# Patient Record
Sex: MALE | Race: WHITE | NOT HISPANIC OR LATINO | Employment: OTHER | URBAN - NONMETROPOLITAN AREA
[De-identification: names, ages, dates, MRNs, and addresses within clinical notes are randomized per-mention and may not be internally consistent; named-entity substitution may affect disease eponyms.]

---

## 2018-08-27 ENCOUNTER — OFFICE VISIT (OUTPATIENT)
Dept: PEDIATRICS | Facility: OTHER | Age: 83
End: 2018-08-27
Attending: INTERNAL MEDICINE
Payer: COMMERCIAL

## 2018-08-27 VITALS
HEART RATE: 72 BPM | SYSTOLIC BLOOD PRESSURE: 130 MMHG | DIASTOLIC BLOOD PRESSURE: 84 MMHG | WEIGHT: 189.25 LBS | TEMPERATURE: 97.4 F | BODY MASS INDEX: 26.49 KG/M2 | HEIGHT: 71 IN

## 2018-08-27 DIAGNOSIS — Z96.612 STATUS POST REPLACEMENT OF BOTH SHOULDER JOINTS: ICD-10-CM

## 2018-08-27 DIAGNOSIS — M15.0 PRIMARY OSTEOARTHRITIS INVOLVING MULTIPLE JOINTS: ICD-10-CM

## 2018-08-27 DIAGNOSIS — E78.1 HYPERTRIGLYCERIDEMIA: ICD-10-CM

## 2018-08-27 DIAGNOSIS — M75.102 ROTATOR CUFF SYNDROME, LEFT: Primary | ICD-10-CM

## 2018-08-27 DIAGNOSIS — Z96.611 STATUS POST REPLACEMENT OF BOTH SHOULDER JOINTS: ICD-10-CM

## 2018-08-27 PROBLEM — M19.90 OSTEOARTHROSIS: Status: ACTIVE | Noted: 2018-08-27

## 2018-08-27 PROBLEM — E78.00 HYPERCHOLESTEROLEMIA: Status: ACTIVE | Noted: 2018-08-27

## 2018-08-27 PROCEDURE — 99213 OFFICE O/P EST LOW 20 MIN: CPT | Performed by: INTERNAL MEDICINE

## 2018-08-27 PROCEDURE — G0463 HOSPITAL OUTPT CLINIC VISIT: HCPCS | Performed by: INTERNAL MEDICINE

## 2018-08-27 RX ORDER — TURMERIC 400 MG
1 CAPSULE ORAL DAILY
COMMUNITY

## 2018-08-27 RX ORDER — MULTIVIT WITH MINERALS/LUTEIN
1000 TABLET ORAL DAILY
COMMUNITY

## 2018-08-27 RX ORDER — LOVASTATIN 20 MG
20 TABLET ORAL DAILY
COMMUNITY
Start: 2013-11-05 | End: 2021-05-10

## 2018-08-27 RX ORDER — HYDROCORTISONE ACETATE 0.5 %
1 CREAM (GRAM) TOPICAL 3 TIMES DAILY
COMMUNITY

## 2018-08-27 RX ORDER — CHLORAL HYDRATE 500 MG
1 CAPSULE ORAL DAILY
COMMUNITY

## 2018-08-27 RX ORDER — OMEPRAZOLE 10 MG/1
10 CAPSULE, DELAYED RELEASE ORAL DAILY
COMMUNITY
End: 2022-01-11

## 2018-08-27 ASSESSMENT — PAIN SCALES - GENERAL: PAINLEVEL: MODERATE PAIN (5)

## 2018-08-27 NOTE — MR AVS SNAPSHOT
"              After Visit Summary   8/27/2018    Anibal Tenorio    MRN: [id number]           Patient Information     Date Of Birth          1935        Visit Information        Provider Department      8/27/2018 10:45 AM Derek Guillory MD [facility]        Today's Diagnoses     Rotator cuff syndrome, left    -  1    Status post replacement of both shoulder joints        Hypertriglyceridemia        Primary osteoarthritis involving multiple joints           Follow-ups after your visit        Additional Services     ORTHOPEDICS ADULT REFERRAL       OA or any                  Who to contact     If you have questions or need follow up information about today's clinic visit or your schedule please contact [facility] directly at [phone].  Normal or non-critical lab and imaging results will be communicated to you by Certaliahart, letter or phone within 4 business days after the clinic has received the results. If you do not hear from us within 7 days, please contact the clinic through Certaliahart or phone. If you have a critical or abnormal lab result, we will notify you by phone as soon as possible.  Submit refill requests through Placester or call your pharmacy and they will forward the refill request to us. Please allow 3 business days for your refill to be completed.          Additional Information About Your Visit        MyChart Information     Placester lets you send messages to your doctor, view your test results, renew your prescriptions, schedule appointments and more. To sign up, go to [email or web address] . Click on \"Log in\" on the left side of the screen, which will take you to the Welcome page. Then click on \"Sign up Now\" on the right side of the page.     You will be asked to enter the access code listed below, as well as some personal information. Please follow the directions to create your username and password.     Your access code is: " [email or web address]  Expires: 2018 11:32 AM     Your access code will  in 90 days. If you need help or a new code, please call your [facility] clinic or [phone].        Care EveryWhere ID     This is your Care EveryWhere ID. This could be used by other organizations to access your [facility] medical records  [id number]        Your Vitals Were     Pulse Temperature Height BMI (Body Mass Index)          72 97.4  F (36.3  C) (Tympanic) 5' 10.75\" (1.797 m) 26.58 kg/m2         Blood Pressure from Last 3 Encounters:   18 130/84   08/14/15 130/80   07/14/15 116/78    Weight from Last 3 Encounters:   18 189 lb 4 oz (85.8 kg)   08/14/15 181 lb (82.1 kg)   07/14/15 183 lb (83 kg)              We Performed the Following     ORTHOPEDICS ADULT REFERRAL        Primary Care Provider Office Phone # Fax #    Derek Cody Guillory [phone]       [address] [facility] [address]        Equal Access to Services     [facility]: Hadii aad trinh hadasho Soomaira, waaxda luqadaha, qaybta kaalmada adeegyada, ben mac . So [facility] [phone].    ATENCIÓN: Si habla español, tiene a braxton disposición servicios gratuitos de asistencia lingüística. Llame al [phone].    We comply with applicable federal civil rights laws and [place] laws. We do not discriminate on the basis of race, color, national origin, age, disability, sex, sexual orientation, or gender identity.            Thank you!     Thank you for choosing [facility] AND [facility]  for your care. Our goal is always to provide you with excellent care. Hearing back from our patients is one way we can continue to improve our services. Please take a few minutes to complete the written survey that you may receive in the mail after your visit with us. Thank you!             Your Updated Medication List - Protect others around you: Learn how to safely use, store and throw away your medicines at " www.disposemymeds.org.          This list is accurate as of 8/27/18 11:32 AM.  Always use your most recent med list.                   Brand Name Dispense Instructions for use Diagnosis    ascorbic acid 1000 MG Tabs    vitamin C     Take 1,000 mg by mouth daily        CALCIUM 1000 + D 1000-800 MG-UNIT Tabs   Generic drug:  Calcium Carb-Cholecalciferol      Take 1 tablet by mouth daily        fish oil-omega-3 fatty acids 1000 MG capsule      Take 1 capsule by mouth daily        GLUCOSAMINE 1500 COMPLEX Caps      Take 1 capsule by mouth 3 times daily        L-LYSINE HCL PO      Take 1,000 mg by mouth daily        lovastatin 20 MG tablet    MEVACOR     Take 20 mg by mouth daily        omeprazole 10 MG CR capsule    priLOSEC     Take 10 mg by mouth daily        SYSTANE 0.4-0.3 % Soln ophthalmic solution   Generic drug:  polyethylene glycol 0.4%- propylene glycol 0.3%      Place 1 drop into both eyes 4 times daily as needed for dry eyes        Turmeric 400 MG Caps      Take 1 capsule by mouth daily        VITAMIN D-1000 MAX ST 1000 units Tabs   Generic drug:  cholecalciferol      Take 1,000 Units by mouth daily

## 2018-08-27 NOTE — PROGRESS NOTES
Subjective  Anibal Tenorio is a 82 year old male who presents for multiple concerns.  About a month ago he was having pain on his left lower side area.  Upper outer buttock on the left.  It felt like a nerve, lightning strike.  That improved with ibuprofen.  For the last 6-7 days he has had pain in the left upper outer shoulder area on the back.  It is a dull constant ache that comes and goes.  Worse with not sure, better with ibuprofen.  No change in symptoms with exertion.  Last week he helped his brother move including using a skid , moving lumbar, etc.  No chest pain, no dyspnea, no diaphoresis.  [facility]  He wants me to look at the results.    Problem List/PMH: reviewed in EMR, and made relevant updates today.  Medications: reviewed in EMR, and made relevant updates today.  Allergies: reviewed in EMR, and made relevant updates today.    Social Hx:  Social History   Substance Use Topics     Smoking status: Former Smoker     Packs/day: 0.50     Years: 20.00     Types: Cigarettes     Quit date: 1970     Smokeless tobacco: Never Used      Comment: Quit smoking: coffee 3/d     Alcohol use 8.4 oz/week     Social History     Social History Narrative    . Wife originally from [place].  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  6 children.   Quit smoking at age 35.   Wife was in a memory care unit, not able to speak,  Wife  age 76.  [place] in [place]  [facility]     I reviewed social history and made relevant updates today.    Family Hx:   Family History   Problem Relation Age of Onset     Other - See Comments Mother      Psychiatric illness,Dementia     Other - See Comments Father      Psychiatric illness,Dementia     Prostate Cancer Brother 65     Cancer-prostate,1/2 brother     Colon Cancer No family hx of      Cancer-colon       Objective  Vitals: reviewed in EMR.  /84  Pulse 72  Temp 97.4  F (36.3  C) (Tympanic)   "Ht 5' 10.75\" (1.797 m)  Wt 189 lb 4 oz (85.8 kg)  BMI 26.58 kg/m2    Gen: Pleasant male, NAD.  HEENT: MMM  Neck: Supple  Pulm: Breathing easily  Neuro: Grossly intact  Skin: No concerning lesions.  Psychiatric: Normal affect and insight. Does not appear anxious or depressed.  Musculoskeletal: Positive can test on the left.  Weakness to left shoulder abduction 3/5, internal and external rotation at the shoulders 5/5 bilaterally.  Mild tenderness to palpation over left rhomboid muscle.    [facility]  Total cholesterol 200  Kidney function normal  CBC normal    Assessment    ICD-10-CM    1. Rotator cuff syndrome, left M75.102 ORTHOPEDICS ADULT REFERRAL   2. Status post replacement of both shoulder joints Z96.611 ORTHOPEDICS ADULT REFERRAL    Z96.612    3. Hypertriglyceridemia E78.1    4. Primary osteoarthritis involving multiple joints M15.0      Orders Placed This Encounter   Procedures     ORTHOPEDICS ADULT REFERRAL       He has some weakness of the left shoulder which I believe is from likely several rotator cuff tendinopathy's and/or tears.  Complicated by history of bilateral shoulder replacement.  Recommend orthopedic consultation prior to obtaining MRI.  Low back pain consistent with lumbar disc disease.  Warning signs were discussed.    Plan   -- Expected clinical course discussed   -- Ortho consult    Signed, Derek Guillory MD  Internal Medicine & Pediatrics    "

## 2018-08-28 ASSESSMENT — PATIENT HEALTH QUESTIONNAIRE - PHQ9: SUM OF ALL RESPONSES TO PHQ QUESTIONS 1-9: 2

## 2018-09-13 ENCOUNTER — TRANSFERRED RECORDS (OUTPATIENT)
Dept: ORTHOPEDICS | Facility: OTHER | Age: 83
End: 2018-09-13

## 2018-09-14 ENCOUNTER — MEDICAL CORRESPONDENCE (OUTPATIENT)
Dept: HEALTH INFORMATION MANAGEMENT | Facility: OTHER | Age: 83
End: 2018-09-14

## 2018-09-20 ENCOUNTER — HOSPITAL ENCOUNTER (OUTPATIENT)
Dept: MRI IMAGING | Facility: OTHER | Age: 83
Discharge: HOME | End: 2018-09-20
Attending: ORTHOPAEDIC SURGERY | Admitting: ORTHOPAEDIC SURGERY
Payer: COMMERCIAL

## 2018-09-20 DIAGNOSIS — M25.512 LEFT SHOULDER PAIN: ICD-10-CM

## 2018-09-20 DIAGNOSIS — M75.102 ROTATOR CUFF SYNDROME OF LEFT SHOULDER: ICD-10-CM

## 2018-09-20 PROCEDURE — 73221 MRI JOINT UPR EXTREM W/O DYE: CPT | Mod: LT

## 2018-10-04 ENCOUNTER — TRANSFERRED RECORDS (OUTPATIENT)
Dept: HEALTH INFORMATION MANAGEMENT | Facility: OTHER | Age: 83
End: 2018-10-04

## 2018-11-08 DIAGNOSIS — M75.111 INCOMPLETE ROTATOR CUFF TEAR OR RUPTURE OF RIGHT SHOULDER, NOT SPECIFIED AS TRAUMATIC: Primary | ICD-10-CM

## 2018-11-09 ENCOUNTER — HOSPITAL ENCOUNTER (OUTPATIENT)
Dept: PHYSICAL THERAPY | Facility: OTHER | Age: 83
Setting detail: THERAPIES SERIES
End: 2018-11-09
Attending: PHYSICAL MEDICINE & REHABILITATION
Payer: COMMERCIAL

## 2018-11-09 DIAGNOSIS — M75.111 INCOMPLETE ROTATOR CUFF TEAR OR RUPTURE OF RIGHT SHOULDER, NOT SPECIFIED AS TRAUMATIC: ICD-10-CM

## 2018-11-09 PROCEDURE — 97110 THERAPEUTIC EXERCISES: CPT | Mod: GP

## 2018-11-09 PROCEDURE — 97161 PT EVAL LOW COMPLEX 20 MIN: CPT | Mod: GP

## 2018-11-09 NOTE — PROGRESS NOTES
11/09/18 1000   General Information   Type of Visit Initial OP Ortho PT Evaluation   Start of Care Date 11/09/18   [facility]   Orders Evaluate and Treat   Date of Order 11/08/18   Insurance Type Other   Ins[facility] Comments/[facility]its [facility]   Medical Diagnosis Right RC tear or rupture of right shoulder   Surgical/Medical history reviewed Yes   Body Part(s)   Body Part(s) Shoulder   Presentation and Etiology   Pertinent history of current problem (include personal factors and/or comorbidities that impact the POC) Patient is a 83 y/o male whom presents to PT with c/o a long history of right shoulder pain. He reports a history of  bilateral RCR repairs. Patient reports no specific injury. Has pain when laying on the right side. Reports pain is constant but worse with use in overhead positions. A recent x-ray revealed right shoulder GH and AC joint OA.. An MRI is planned for 11/19/2018.   Impairments A. Pain;E. Decreased flexibility;D. Decreased ROM;F. Decreased strength and endurance   Functional Limitations perform activities of daily living;perform required work activities;perform desired leisure / sports activities   Symptom Location Postreior right shoulder Scapular region   How/Where did it occur From insidious onset   Onset date of current episode/exacerbation 09/01/18   Chronicity New   Pain rating (0-10 point scale) Best (/10);Worst (/10)   Best (/10) 2-3   Worst (/10) 6-8   Pain quality C. Aching   Frequency of pain/symptoms A. Constant   Pain/symptoms are: The same all the time   Pain/symptoms exacerbated by G. Certain positions;C. Lifting;H. Overhead reach;K. Home tasks   Pain/symptoms eased by H. Cold   Current / Previous Interventions   Diagnostic Tests: Other   Other diagnostic tests Plans for MRI 1/19/2018   Prior Level of Function   Prior Level of Function-Mobility NML   Prior Level of Function-ADLs NML   Current Level of Function   Current Community Support  Family/friend caregiver   Patient role/employment history F. Retired   Living environment [facility]   Fall Risk Screen   Fall screen completed by PT   Have you fallen 2 or more times in the past year? No   Have you fallen and had an injury in the past year? No   Is patient a fall risk? No   Abuse Risk Screen   QUESTION TO PATIENT:  Has a member of your family or a partner(now or in the past) intimidated, hurt, manipulated, or controlled you in any way? no   QUESTION TO PATIENT: Do you feel safe going back to the place where you are living? yes   OBSERVATION: Is there reason to believe there has been maltreatment of a vulnerable adult (ie. Physical/Sexual/Emotional abuse, self neglect, lack of adequate food, shelter, medical care, or financial exploitation)? no   Shoulder Objective Findings   Side (if bilateral, select both right and left) Right   Observation Noted guarding with shoulder flexion   Posture Reduced lumbar lordosis   Cervical Screen (ROM, quadrant) NML   Thoracic Mobility Screen WFL   Shoulder ROM Comment Limited right EROT due to pain   Scapulothoracic Rhythm Right scapular dyskinesis   Pec Minor (supine) Flexibility NML   Neer's Test POS   Power-Fredi Test NEG   Bursa Test NEG   Stickney's Test NEG   Crossover Test mild POS   Palpation Tender right teres minor and infraspinatus    Right Shoulder Flexion AROM 140   Right Shoulder Flexion PROM 165   Right Shoulder Abduction AROM 147   Right Shoulder Abduction PROM 163   Right Shoulder ER AROM 58   Right Shoulder ER PROM 75   Right Shoulder IR AROM 74   Right Shoulder IR PROM NML   Right Shoulder Flexion Strength 4   Right Shoulder Abduction Strength 4+   Right Shoulder ER Strength 3-   Right Shoulder IR Strength 5   Right Shoulder Extension Strength 5   Right Mid Trapezius Strength 4   Right Lower Trapezius Strength 4   Planned Therapy Interventions   Planned Therapy Interventions joint mobilization;manual therapy;ROM;strengthening;stretching    Planned Modality Interventions   Planned Modality Interventions Cryotherapy;Electrical stimulation;Hot packs;Ultrasound;Iontophoresis   Planned Modality Interventions Comments Use as needed   Clinical Impression   Criteria for Skilled Therapeutic Interventions Met yes, treatment indicated   PT Diagnosis Right shoulder pain and weakness due to tendinosis   Influenced by the following impairments Pain, weakness   Functional limitations due to impairments Unable to use the right UE in elevated positions.   Clinical Presentation Stable/Uncomplicated   Clinical Decision Making (Complexity) Low complexity   Therapy Frequency 2 times/Week   Predicted Duration of Therapy Intervention (days/wks) 12 weeks   Risk & Benefits of therapy have been explained Yes   Patient, Family & other staff in agreement with plan of care Yes   Education Assessment   Preferred Learning Style Listening;Reading;Demonstration;Pictures/video   Barriers to Learning No barriers   ORTHO GOALS   PT Ortho Eval Goals 1;2;3   Ortho Goal 1   Goal Identifier Pain   Goal Description Patient will report decreased right shoulder paijn to 2-3/10 at worse with use of the shoulder in elevated positions using hand tools for work tasks in 8-12 weeks   Target Date 01/25/19   Ortho Goal 2   Goal Identifier Mobility   Goal Description Patient will demo the ability to use the right shoulder in all nml positions of elevation or rotation for carpentry work tasks in 8-12 weeks   Target Date 01/31/19   Ortho Goal 3   Goal Identifier Strength   Goal Description Patient will demo full right shoulder strength with MMT revealing at least 4+/5 in all muscles of the right RC supporting use of the shoulder for carpentry tasks in 8-12 weeks   Target Date 01/25/19   Total Evaluation Time   Total Evaluation Time 35

## 2018-11-21 DIAGNOSIS — M17.11 PRIMARY OSTEOARTHRITIS OF RIGHT KNEE: Primary | ICD-10-CM

## 2018-11-26 ENCOUNTER — HOSPITAL ENCOUNTER (OUTPATIENT)
Dept: PHYSICAL THERAPY | Facility: OTHER | Age: 83
Setting detail: THERAPIES SERIES
End: 2018-11-26
Attending: PHYSICAL MEDICINE & REHABILITATION
Payer: COMMERCIAL

## 2018-11-26 PROCEDURE — 97140 MANUAL THERAPY 1/> REGIONS: CPT | Mod: GP

## 2018-11-26 PROCEDURE — 97110 THERAPEUTIC EXERCISES: CPT | Mod: GP

## 2018-11-26 PROCEDURE — 97035 APP MDLTY 1+ULTRASOUND EA 15: CPT | Mod: GP

## 2018-11-28 ENCOUNTER — HOSPITAL ENCOUNTER (OUTPATIENT)
Dept: PHYSICAL THERAPY | Facility: OTHER | Age: 83
Setting detail: THERAPIES SERIES
End: 2018-11-28
Attending: PHYSICAL MEDICINE & REHABILITATION
Payer: COMMERCIAL

## 2018-11-28 DIAGNOSIS — M17.11 PRIMARY OSTEOARTHRITIS OF RIGHT KNEE: ICD-10-CM

## 2018-11-28 PROCEDURE — 97110 THERAPEUTIC EXERCISES: CPT | Mod: GP

## 2018-11-28 PROCEDURE — 97161 PT EVAL LOW COMPLEX 20 MIN: CPT | Mod: GP

## 2018-12-13 ENCOUNTER — HOSPITAL ENCOUNTER (OUTPATIENT)
Dept: PHYSICAL THERAPY | Facility: OTHER | Age: 83
Setting detail: THERAPIES SERIES
End: 2018-12-13
Attending: PHYSICAL MEDICINE & REHABILITATION
Payer: COMMERCIAL

## 2018-12-13 PROCEDURE — 97140 MANUAL THERAPY 1/> REGIONS: CPT | Mod: GP

## 2018-12-13 PROCEDURE — 97035 APP MDLTY 1+ULTRASOUND EA 15: CPT | Mod: GP

## 2018-12-13 PROCEDURE — 97110 THERAPEUTIC EXERCISES: CPT | Mod: GP

## 2018-12-17 ENCOUNTER — HOSPITAL ENCOUNTER (OUTPATIENT)
Dept: PHYSICAL THERAPY | Facility: OTHER | Age: 83
Setting detail: THERAPIES SERIES
End: 2018-12-17
Attending: PHYSICAL MEDICINE & REHABILITATION
Payer: COMMERCIAL

## 2018-12-17 PROCEDURE — 97035 APP MDLTY 1+ULTRASOUND EA 15: CPT | Mod: GP

## 2018-12-17 PROCEDURE — 97110 THERAPEUTIC EXERCISES: CPT | Mod: GP

## 2018-12-17 PROCEDURE — 97140 MANUAL THERAPY 1/> REGIONS: CPT | Mod: GP

## 2018-12-21 ENCOUNTER — HOSPITAL ENCOUNTER (OUTPATIENT)
Dept: PHYSICAL THERAPY | Facility: OTHER | Age: 83
Setting detail: THERAPIES SERIES
End: 2018-12-21
Attending: PHYSICAL MEDICINE & REHABILITATION
Payer: COMMERCIAL

## 2018-12-21 PROCEDURE — 97110 THERAPEUTIC EXERCISES: CPT | Mod: GP

## 2018-12-21 PROCEDURE — 97035 APP MDLTY 1+ULTRASOUND EA 15: CPT | Mod: GP

## 2018-12-21 PROCEDURE — 97140 MANUAL THERAPY 1/> REGIONS: CPT | Mod: GP

## 2018-12-27 ENCOUNTER — HOSPITAL ENCOUNTER (OUTPATIENT)
Dept: PHYSICAL THERAPY | Facility: OTHER | Age: 83
Setting detail: THERAPIES SERIES
End: 2018-12-27
Attending: PHYSICAL MEDICINE & REHABILITATION
Payer: COMMERCIAL

## 2018-12-27 PROCEDURE — 97035 APP MDLTY 1+ULTRASOUND EA 15: CPT | Mod: GP

## 2018-12-27 PROCEDURE — 97140 MANUAL THERAPY 1/> REGIONS: CPT | Mod: GP

## 2019-01-03 ENCOUNTER — HOSPITAL ENCOUNTER (OUTPATIENT)
Dept: PHYSICAL THERAPY | Facility: OTHER | Age: 84
Setting detail: THERAPIES SERIES
End: 2019-01-03
Attending: PHYSICAL MEDICINE & REHABILITATION
Payer: COMMERCIAL

## 2019-01-03 PROCEDURE — 97140 MANUAL THERAPY 1/> REGIONS: CPT | Mod: GP

## 2019-01-03 PROCEDURE — 97035 APP MDLTY 1+ULTRASOUND EA 15: CPT | Mod: GP

## 2019-01-03 NOTE — PROGRESS NOTES
Outpatient Physical Therapy Progress Note     Patient: Anibal Tenorio  : 1935    End Dates of Reporting Period:   1/3/2019    Referring Provider: Juani Rob Diagnosis: Right RC tear/rupture     Client Self Report: Reports his shoulder pain continues to decrease. He is able to increase resistance levels for all HEP exercises. He continues to report pain with tasks requiring overhead use of the shoulder.    Objective Measurements:  Objective Measure: ROM  Details: Flexion to 160 deg, Abduction to 155, EROT to 79 deg, IROT NML  Objective Measure: Strength  Details: Left EROT improved to 4-. Flexion and abduction remain at 4+/5. Strength remains limited by posterior shoulder pain.  Objective Measure: Tenderness  Details: Reduced tenderness (1/4) of the right infraspinatus proximal origin. MInor tenderness of the supraspinatus.          Goals:  Goal Identifier Pain   Goal Description Patient will report decreased right shoulder paijn to 2-3/10 at worse with use of the shoulder in elevated positions using hand tools for work tasks in 8-12 weeks   Target Date 19   Date Met     Progress: He is now able to complete 70% of all tasks without pain. He continues to have discomfort with uses of the shoulder in overhead positions     Goal Identifier Mobility   Goal Description Patient will demo the ability to use the right shoulder in all nml positions of elevation or rotation for carpentry work tasks in 8-12 weeks   Target Date 19   Date Met      Progress: See objective measures above. Flexion, EROT/IROT are now equal to the left shoulder. Shoulder abduction ROM remains limited due to pain.     Goal Identifier Strength   Goal Description Patient will demo full right shoulder strength with MMT revealing at least 4+/5 in all muscles of the right RC supporting use of the shoulder for carpentry tasks in 8-12 weeks   Target Date 19   Date Met      Progress: Good progress made except with  EROT which remains limited due to posterior shoulder pain.     Progress Toward Goals:   Progress this reporting period: As above          Plan:  Continue therapy per current plan of care. Request for up to 6 additional PT sessions to develop full ROM and strength, further reduce posterior shoulder pain    Discharge: NO

## 2019-01-07 ENCOUNTER — HOSPITAL ENCOUNTER (OUTPATIENT)
Dept: PHYSICAL THERAPY | Facility: OTHER | Age: 84
Setting detail: THERAPIES SERIES
End: 2019-01-07
Attending: PHYSICAL MEDICINE & REHABILITATION
Payer: COMMERCIAL

## 2019-01-07 PROCEDURE — 97140 MANUAL THERAPY 1/> REGIONS: CPT | Mod: GP

## 2019-01-07 PROCEDURE — 97110 THERAPEUTIC EXERCISES: CPT | Mod: GP

## 2019-01-07 PROCEDURE — 97035 APP MDLTY 1+ULTRASOUND EA 15: CPT | Mod: GP

## 2019-01-15 ENCOUNTER — HOSPITAL ENCOUNTER (OUTPATIENT)
Dept: PHYSICAL THERAPY | Facility: OTHER | Age: 84
Setting detail: THERAPIES SERIES
End: 2019-01-15
Attending: PHYSICAL MEDICINE & REHABILITATION
Payer: COMMERCIAL

## 2019-01-15 PROCEDURE — 97110 THERAPEUTIC EXERCISES: CPT | Mod: GP

## 2019-01-15 PROCEDURE — 97035 APP MDLTY 1+ULTRASOUND EA 15: CPT | Mod: GP

## 2019-01-15 PROCEDURE — 97140 MANUAL THERAPY 1/> REGIONS: CPT | Mod: GP

## 2019-03-06 ENCOUNTER — DOCUMENTATION ONLY (OUTPATIENT)
Dept: OTHER | Facility: CLINIC | Age: 84
End: 2019-03-06

## 2019-03-15 NOTE — ADDENDUM NOTE
Encounter addended by: Dante Bach on: 3/15/2019 4:51 PM   Actions taken: Pend clinical note, Flowsheet accepted, Sign clinical note, Episode resolved

## 2019-03-15 NOTE — PROGRESS NOTES
Outpatient Physical Therapy Discharge Note     Patient: Anibal Tenorio  : 1935    End Dates of Reporting Period:  1/15/2019    Referring Provider: Juani Rob Diagnosis: R RC  Shoulder Pain/tear     Client Self Report:      Objective Measurements:  Objective Measure: ROM  Details: AROM flexion to 167, abduction to 163, EROT to 79, IROT NML  Objective Measure: Strength  Details: Grade 4+ EROT, Flexion 5/5, abduction 4+/5  Objective Measure: Tenderness  Details: Minimal tenderness of the right infraspinatus tendon             Goals:  Goal Identifier Pain   Goal Description Patient will report decreased right shoulder paijn to 2-3/10 at worse with use of the shoulder in elevated positions using hand tools for work tasks in 8-12 weeks   Target Date 19   Date Met  18   Progress: Unplanned Discharge. No formal discontinue evaluation     Goal Identifier Mobility   Goal Description Patient will demo the ability to use the right shoulder in all nml positions of elevation or rotation for carpentry work tasks in 8-12 weeks   Target Date 19   Date Met      Progress: Unplanned Discharge. No formal discontinue evaluation     Goal Identifier Strength   Goal Description Patient will demo full right shoulder strength with MMT revealing at least 4+/5 in all muscles of the right RC supporting use of the shoulder for carpentry tasks in 8-12 weeks   Target Date 19   Date Met      Progress: Unplanned Discharge. No formal discontinue evaluation     Goal Identifier     Goal Description     Target Date     Date Met      Progress:       Progress Toward Goals:   Progress this reporting period: See Last treatment note          Plan:  Discharge from therapy.    Discharge:    Reason for Discharge: Patient has failed to schedule further appointments.    Equipment Issued: None    Discharge Plan:  Unplanned Discharge. No formal discontinue evaluation

## 2020-02-13 ENCOUNTER — TRANSFERRED RECORDS (OUTPATIENT)
Dept: HEALTH INFORMATION MANAGEMENT | Facility: OTHER | Age: 85
End: 2020-02-13

## 2020-11-19 ENCOUNTER — OFFICE VISIT (OUTPATIENT)
Dept: URBAN - METROPOLITAN AREA CLINIC 13 | Facility: CLINIC | Age: 85
End: 2020-11-19
Payer: COMMERCIAL

## 2020-11-19 PROCEDURE — 67028 INJECTION EYE DRUG: CPT | Performed by: OPHTHALMOLOGY

## 2020-11-19 PROCEDURE — 92014 COMPRE OPH EXAM EST PT 1/>: CPT | Performed by: OPHTHALMOLOGY

## 2020-11-19 PROCEDURE — 92134 CPTRZ OPH DX IMG PST SGM RTA: CPT | Performed by: OPHTHALMOLOGY

## 2020-11-19 ASSESSMENT — INTRAOCULAR PRESSURE
OS: 17
OD: 14

## 2020-11-19 NOTE — IMPRESSION/PLAN
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 Plan: He returns for the winter and his last injection OD was 6/9/20. He has mild CME OD today. OCT shows edema nasally OD and PED OS. We discussed the findings and natural history. Discussed observation vs treatment and the R/B of each. I recommend treatment today to reduce the risk of vision loss. Eylea injected OD without complication after discussing the R/BI/A in [facility]. He will call us with any new visual changes.  
RTC 2 months OCT OU; poss eylea OD

## 2020-11-19 NOTE — IMPRESSION/PLAN
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 Plan: The patient has a Posterior Vitreous Detachment (PVD). At this time, no retinal tear or retinal detachment is identified. We discussed the natural history of a PVD. Retinal detachment symptoms were reviewed. Patient was encouraged to call our office if there is an increase in floaters, decrease in vision, or a shadow or curtain is noted in their peripheral vision.

## 2021-01-21 ENCOUNTER — OFFICE VISIT (OUTPATIENT)
Dept: URBAN - METROPOLITAN AREA CLINIC 13 | Facility: CLINIC | Age: 86
End: 2021-01-21
Payer: COMMERCIAL

## 2021-01-21 DIAGNOSIS — H35.3122 NONEXUDATIVE AGE-RELATED MACULAR DEGENERATION, LEFT EYE, INTERMEDIATE DRY STAGE: ICD-10-CM

## 2021-01-21 PROCEDURE — 99213 OFFICE O/P EST LOW 20 MIN: CPT | Performed by: OPHTHALMOLOGY

## 2021-01-21 PROCEDURE — 92134 CPTRZ OPH DX IMG PST SGM RTA: CPT | Performed by: OPHTHALMOLOGY

## 2021-01-21 ASSESSMENT — INTRAOCULAR PRESSURE
OD: 10
OS: 11

## 2021-01-21 NOTE — IMPRESSION/PLAN
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 Plan: He feels his vision is stable and has resolved CME s/p his last injection OD 11/19/20 and prior was 6/9/20. OCT shows no IRF/SRF OD and PED OS. We discussed the findings and natural history. Discussed observation vs treatment and the R/B of each. I recommend treatment today, however, he wants to defer treatment and understands the risk of recurrence and vision loss. He will call us with any new visual changes.  
RTC 1 month OCT OU; poss eylea OD

## 2021-03-04 ENCOUNTER — OFFICE VISIT (OUTPATIENT)
Dept: URBAN - METROPOLITAN AREA CLINIC 13 | Facility: CLINIC | Age: 86
End: 2021-03-04
Payer: COMMERCIAL

## 2021-03-04 DIAGNOSIS — H35.3211 EXUDATIVE AGE-RELATED MACULAR DEGENERATION, RIGHT EYE, WITH ACTIVE CHOROIDAL NEOVASCULARIZATION: ICD-10-CM

## 2021-03-04 DIAGNOSIS — H35.3212 EXUDATIVE AGE-RELATED MACULAR DEGENERATION, RIGHT EYE, WITH INACTIVE CHOROIDAL NEOVASCULARIZATION: Primary | ICD-10-CM

## 2021-03-04 PROCEDURE — 92134 CPTRZ OPH DX IMG PST SGM RTA: CPT | Performed by: OPHTHALMOLOGY

## 2021-03-04 PROCEDURE — 99213 OFFICE O/P EST LOW 20 MIN: CPT | Performed by: OPHTHALMOLOGY

## 2021-03-04 ASSESSMENT — INTRAOCULAR PRESSURE
OD: 17
OS: 17

## 2021-03-04 NOTE — IMPRESSION/PLAN
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 Plan: He feels his vision is good and has resolved CME s/p his last injection OD 11/19/20 and prior was 6/9/20. OCT shows no IRF/SRF OD and PED OS. We discussed the findings and natural history. Discussed observation vs treatment and the R/B of each. He wants to defer treatment and understands the risk of recurrence and vision loss. He will call us with any new visual changes. Otherwise, he will f/u with his retina specialist in [place] in 2 months.  
RTC 9 months OCT OU; poss eylea OD

## 2021-04-15 ENCOUNTER — OFFICE VISIT (OUTPATIENT)
Dept: URBAN - METROPOLITAN AREA CLINIC 13 | Facility: CLINIC | Age: 86
End: 2021-04-15
Payer: COMMERCIAL

## 2021-04-15 PROCEDURE — 99213 OFFICE O/P EST LOW 20 MIN: CPT | Performed by: OPHTHALMOLOGY

## 2021-04-15 PROCEDURE — 92134 CPTRZ OPH DX IMG PST SGM RTA: CPT | Performed by: OPHTHALMOLOGY

## 2021-04-15 ASSESSMENT — INTRAOCULAR PRESSURE
OS: 14
OD: 17

## 2021-04-15 NOTE — IMPRESSION/PLAN
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 Plan: He complains of occasional discomfort OS. However, he's doing well from a retina standpoint. He has resolved CME s/p his last injection OD 11/19/20 and prior was 6/9/20. OCT shows no IRF/SRF OD and PED OS. We discussed the findings and natural history. Discussed observation vs treatment and the R/B of each. He wants to defer treatment and understands the risk of recurrence and vision loss. He will call us with any new visual changes. Otherwise, he will f/u with his retina specialist in [place] in 2 months.  
RTC 8 months OCT OU; luis miguel eylea OD

## 2021-04-26 ENCOUNTER — OFFICE VISIT (OUTPATIENT)
Dept: PEDIATRICS | Facility: OTHER | Age: 86
End: 2021-04-26
Attending: INTERNAL MEDICINE
Payer: COMMERCIAL

## 2021-04-26 VITALS
RESPIRATION RATE: 16 BRPM | DIASTOLIC BLOOD PRESSURE: 62 MMHG | BODY MASS INDEX: 26.27 KG/M2 | OXYGEN SATURATION: 94 % | TEMPERATURE: 96.5 F | HEART RATE: 73 BPM | WEIGHT: 187 LBS | SYSTOLIC BLOOD PRESSURE: 130 MMHG

## 2021-04-26 DIAGNOSIS — K21.9 BILE ACID ESOPHAGEAL REFLUX: ICD-10-CM

## 2021-04-26 DIAGNOSIS — H81.10 BENIGN PAROXYSMAL POSITIONAL VERTIGO, UNSPECIFIED LATERALITY: Primary | ICD-10-CM

## 2021-04-26 DIAGNOSIS — H61.23 CERUMINOSIS, BILATERAL: ICD-10-CM

## 2021-04-26 PROCEDURE — G0463 HOSPITAL OUTPT CLINIC VISIT: HCPCS

## 2021-04-26 PROCEDURE — 99213 OFFICE O/P EST LOW 20 MIN: CPT | Performed by: INTERNAL MEDICINE

## 2021-04-26 NOTE — PROGRESS NOTES
Subjective   Anibal Tenorio is a 85 year old male who presents for evaluation of dizziness and stomach issues.  Friday morning he was swinging his legs out of bed.  He sat up and felt very dizzy and flopped back onto his back.  He tried to get up again and the same thing happened again.  Since then its been happening but a little bit less.  He noticed that it has been happening when he tries to get up from a seated position.  No recent medication changes.  No falls.  He wonders if earwax plugging is a factor.  Noise gets large impacted chunks of wax in his ears.  He has been having stomach issues for a lot of months.  He underwent some testing in [place] including an EGD which showed bile acid reflux.  He tried sucralfate but did not feel like it helped him that much.  His gallbladder has never been removed.    Objective   Vitals: /62 (BP Location: Right arm, Patient Position: Sitting, Cuff Size: Adult Regular)   Pulse 73   Temp 96.5  F (35.8  C) (Tympanic)   Resp 16   Wt 84.8 kg (187 lb)   SpO2 94%   BMI 26.27 kg/m      HEENT: There is impacted cerumen in both external canals bilaterally.    Review and Analysis of Data   I personally reviewed the following:  External notes: No  Results: No  Use of an independent historian: No  Independent review of a test performed by another physician: No  Discussion of management with another physician: No  Moderate risk of morbidity from additional diagnostic testing and/or treatment.    Assessment & Plan   1. Benign paroxysmal positional vertigo, unspecified laterality  We discussed BPPV including pathophysiology, expected clinical course, possible treatments.  We also discussed the possibility for other etiologies including but not limited to cerebellar stroke, cerebellar tumor, acoustic neuroma, others.  - PHYSICAL THERAPY REFERRAL; Future    2. Ceruminosis, bilateral  Impacted cerumen was flushed by the nurse today    3. Bile acid esophageal reflux  I  recommended sucralfate, the patient declines citing he [facility] and plans to discuss it with them.    Patient Instructions    -- PT for vertigo   -- Consider MRI     -- Ear flush today   -- Don't use q-tips      Return if symptoms worsen or fail to improve.    Signed, Derek Guillory MD, FAAP, FACP  Internal Medicine & Pediatrics

## 2021-04-26 NOTE — NURSING NOTE
"Patient presents with complaints of dizziness.  Chief Complaint   Patient presents with     Dizziness       Initial /62 (BP Location: Right arm, Patient Position: Sitting, Cuff Size: Adult Regular)   Pulse 73   Temp 96.5  F (35.8  C) (Tympanic)   Resp 16   Wt 84.8 kg (187 lb)   SpO2 94%   BMI 26.27 kg/m   Estimated body mass index is 26.27 kg/m  as calculated from the following:    Height as of 8/27/18: 1.797 m (5' 10.75\").    Weight as of this encounter: 84.8 kg (187 lb).  Medication Reconciliation: complete    Catia To LPN  "

## 2021-04-29 ENCOUNTER — HOSPITAL ENCOUNTER (OUTPATIENT)
Dept: PHYSICAL THERAPY | Facility: OTHER | Age: 86
Setting detail: THERAPIES SERIES
End: 2021-04-29
Attending: INTERNAL MEDICINE
Payer: COMMERCIAL

## 2021-04-29 DIAGNOSIS — H81.10 BENIGN PAROXYSMAL POSITIONAL VERTIGO, UNSPECIFIED LATERALITY: ICD-10-CM

## 2021-04-29 PROCEDURE — 97162 PT EVAL MOD COMPLEX 30 MIN: CPT | Mod: GP

## 2021-04-29 PROCEDURE — 95992 CANALITH REPOSITIONING PROC: CPT | Mod: GP

## 2021-04-29 NOTE — PROGRESS NOTES
[facility]        OUTPATIENT PHYSICAL THERAPY FUNCTIONAL EVALUATION  PLAN OF TREATMENT FOR OUTPATIENT REHABILITATION  (COMPLETE FOR INITIAL CLAIMS ONLY)  Patient's Last Name, First Name, M.I.  YOB: 1935  Anibal Tenorio     Provider's Name   [facility]   Medical Record No.  [id number]     Start of Care Date:  04/29/21   Onset Date:   4/23/21     Type:     _X__PT   ____OT  ____SLP Medical Diagnosis:  BPPV     PT Diagnosis:  L BPPV Visits from SOC:  1                              __________________________________________________________________________________  Plan of Treatment/Functional Goals:  balance training, neuromuscular re-education(canalith repositioning)           GOALS  standing  Pt able to transfer from supine to standing without vertigo in 4 weeks.   05/27/21    quick movements  Pt able to turn his head quickly for driving without vertigo in 4 weeks.   05/27/21       Therapy Frequency:  1 time/week   Predicted Duration of Therapy Intervention:  up to 6 visits if needed    Nancy Dash, PT                                    I CERTIFY THE NEED FOR THESE SERVICES FURNISHED UNDER        THIS PLAN OF TREATMENT AND WHILE UNDER MY CARE     (Physician co-signature of this document indicates review and certification of the therapy plan).                Certification Date From:  04/29/21   Certification Date To:  06/24/21    Referring Provider:  Dr. Guillory    Initial Assessment  See Epic Evaluation- Start of Care Date: 04/29/21

## 2021-04-29 NOTE — PROGRESS NOTES
04/29/21 1200   Quick Adds   Quick Adds Certification;Vestibular Eval   Type of Visit Initial OP PT Evaluation   General Information   Start of Care Date 04/29/21   Referring Physician Dr. Guillory   Orders Evaluate and Treat as Indicated   Order Date 04/26/21   Medical Diagnosis H81.10 (ICD-10-CM) - Benign paroxysmal positional vertigo, unspecified laterality   Surgical/Medical history reviewed Yes  (stomach issues, shoulder surgeries, OA, allergies)   Pertinent history of current vestibular problem (include personal factors and/or comorbidities that impact the POC)  Hearing loss   Pertinent history of current problem (include personal factors and/or comorbidities that impact the POC) Friday morning 4/23/21 he got up from bed and fell back down backwards then off balance, hasn't been as bad since the first day.  Nothing he can think of that led up to it, was on ladder day before with a bit of a jump down but no fall or anthing that he can think of.  He's been having only a little sx since.  He is careful to get up and stand slowly now and ok.  Not moving quickly as that is worse. When it was bad the first time he felt the room was spinning. He does get earwax build up and it was bad on the left, they couldn't get it at clinic here but [facility] and [facility] were able to [facility].  Flushed both, but L ear worst. No visiual or hearing changes.    Pertinent Visual History  glasses   Patient role/Employment history Retired   Fall Risk Screen   Fall screen completed by PT   Have you fallen 2 or more times in the past year? No   Have you fallen and had an injury in the past year? No   Is patient a fall risk? No   Abuse Screen (yes response referral indicated)   Feels Unsafe at Home or Work/School no   Cognitive Status Examination   Orientation orientation to person, place and time   Range of Motion (ROM)   ROM Comment mild limit with end range rotation and side bending but just pull, no  sx, good flex and ext   Gait   Gait Comments WFL   Oculomotor Exam   Smooth Pursuit Normal   Saccades Normal   VOR Normal   Infrared Goggle Exam or Frenzel Lense Exam   Maurice-Hallpike (right) Negative   Hammond-Hallpike (Left) Upbeating L torsional   Hammond-Hallpike (left) comments less than 5 sec, hard to determine direction   BPPV Canal(s) L Posterior   BPPV Type Canalithasis   Planned Therapy Interventions   Planned Therapy Interventions balance training;neuromuscular re-education  (canalith repositioning)   Clinical Impression   Criteria for Skilled Therapeutic Interventions Met yes, treatment indicated   PT Diagnosis L BPPV   Influenced by the following impairments vertigo   Functional limitations due to impairments impaired transfers, impaired quick movements, impaired mobility   Clinical Presentation Evolving/Changing   Clinical Presentation Rationale improving, age, co-morbidities   Clinical Decision Making (Complexity) Moderate complexity   Therapy Frequency 1 time/week   Predicted Duration of Therapy Intervention (days/wks) up to 6 visits if needed   Risk & Benefits of therapy have been explained Yes   Patient, Family & other staff in agreement with plan of care Yes   Education Assessment   Barriers to Learning Hearing   GOALS   PT Eval Goals 1;2   Goal 1   Goal Identifier standing   Goal Description Pt able to transfer from supine to standing without vertigo in 4 weeks.    Target Date 05/27/21   Goal 2   Goal Identifier quick movements   Goal Description Pt able to turn his head quickly for driving without vertigo in 4 weeks.    Target Date 05/27/21   Total Evaluation Time   PT Annaal, Moderate Complexity Minutes (00914) 30   Therapy Certification   Certification date from 04/29/21   Certification date to 06/24/21   Medical Diagnosis BPPV

## 2021-05-10 ENCOUNTER — HOSPITAL ENCOUNTER (OUTPATIENT)
Dept: ULTRASOUND IMAGING | Facility: OTHER | Age: 86
End: 2021-05-10
Attending: INTERNAL MEDICINE
Payer: COMMERCIAL

## 2021-05-10 ENCOUNTER — OFFICE VISIT (OUTPATIENT)
Dept: PEDIATRICS | Facility: OTHER | Age: 86
End: 2021-05-10
Attending: INTERNAL MEDICINE
Payer: COMMERCIAL

## 2021-05-10 VITALS
HEIGHT: 71 IN | RESPIRATION RATE: 17 BRPM | DIASTOLIC BLOOD PRESSURE: 70 MMHG | SYSTOLIC BLOOD PRESSURE: 118 MMHG | OXYGEN SATURATION: 96 % | TEMPERATURE: 97.6 F | BODY MASS INDEX: 25.9 KG/M2 | WEIGHT: 185 LBS | HEART RATE: 73 BPM

## 2021-05-10 DIAGNOSIS — N50.89 TESTICULAR MASS: ICD-10-CM

## 2021-05-10 DIAGNOSIS — K21.9 BILE ACID ESOPHAGEAL REFLUX: Primary | ICD-10-CM

## 2021-05-10 PROCEDURE — G0463 HOSPITAL OUTPT CLINIC VISIT: HCPCS | Mod: 25

## 2021-05-10 PROCEDURE — G0463 HOSPITAL OUTPT CLINIC VISIT: HCPCS

## 2021-05-10 PROCEDURE — 76870 US EXAM SCROTUM: CPT

## 2021-05-10 PROCEDURE — 99213 OFFICE O/P EST LOW 20 MIN: CPT | Performed by: INTERNAL MEDICINE

## 2021-05-10 SDOH — HEALTH STABILITY: MENTAL HEALTH: HOW OFTEN DO YOU HAVE A DRINK CONTAINING ALCOHOL?: 4 OR MORE TIMES A WEEK

## 2021-05-10 SDOH — HEALTH STABILITY: MENTAL HEALTH: HOW MANY STANDARD DRINKS CONTAINING ALCOHOL DO YOU HAVE ON A TYPICAL DAY?: 1 OR 2

## 2021-05-10 SDOH — HEALTH STABILITY: MENTAL HEALTH: HOW OFTEN DO YOU HAVE 6 OR MORE DRINKS ON ONE OCCASION?: NOT ASKED

## 2021-05-10 ASSESSMENT — PAIN SCALES - GENERAL: PAINLEVEL: NO PAIN (0)

## 2021-05-10 ASSESSMENT — MIFFLIN-ST. JEOR: SCORE: 1538.34

## 2021-05-10 NOTE — PROGRESS NOTES
"Subjective   Anibal Tenorio is a 85 year old male who presents for review of records, growth on testicle.  He had some diagnostic work-up done in [place] for his acid reflux.  Please see media for scanned documents.  He underwent an EGD.  They found gastritis consistent with bile acid reflux and recommend PPI and Carafate.  He is no longer taking Protonix, but switched to omeprazole and symptoms are well controlled.  He took Carafate for a while and then discontinued it.  He has noticed a swelling in his right testicle.  The right testicle has always been larger than the left since he can remember.  Now he thinks \"the plumbing is enlarged\" on the right side.  There is no pain.  There is enlargement by the tubes.  No hernia.    Objective   Vitals: /70 (BP Location: Right arm, Patient Position: Sitting, Cuff Size: Adult Regular)   Pulse 73   Temp 97.6  F (36.4  C) (Tympanic)   Resp 17   Ht 1.791 m (5' 10.5\")   Wt 83.9 kg (185 lb)   SpO2 96%   BMI 26.17 kg/m      : Testicles are descended bilaterally.  No testicular tenderness or masses.  There is a mass palpable on the right epididymis.  No hernias are seen.    Review and Analysis of Data   I personally reviewed the following:  External notes: Yes  Results: Yes  Use of an independent historian: No  Independent review of a test performed by another physician: No  Discussion of management with another physician: No  Moderate risk of morbidity from additional diagnostic testing and/or treatment.    Assessment & Plan   1. Bile acid esophageal reflux  I recommend the use of sucralfate which the patient declined.    2. Testicular mass  Differential diagnosis includes varicocele, spermatocele, occult malignancy, others.  I recommend an ultrasound as a next step and should abnormalities be present will request urology consultation.  - US Testicular & Scrotum w Doppler Ltd; Future      Signed, Derek Guillory MD, FAAP, FACP  Internal Medicine & Pediatrics    "

## 2021-05-13 ENCOUNTER — TRANSFERRED RECORDS (OUTPATIENT)
Dept: HEALTH INFORMATION MANAGEMENT | Facility: OTHER | Age: 86
End: 2021-05-13

## 2021-05-18 ENCOUNTER — HOSPITAL ENCOUNTER (OUTPATIENT)
Dept: PHYSICAL THERAPY | Facility: OTHER | Age: 86
Setting detail: THERAPIES SERIES
End: 2021-05-18
Attending: INTERNAL MEDICINE
Payer: COMMERCIAL

## 2021-05-18 PROCEDURE — 97112 NEUROMUSCULAR REEDUCATION: CPT | Mod: GP,59

## 2021-05-18 PROCEDURE — 95992 CANALITH REPOSITIONING PROC: CPT | Mod: GP

## 2021-05-20 ENCOUNTER — HOSPITAL ENCOUNTER (OUTPATIENT)
Dept: PHYSICAL THERAPY | Facility: OTHER | Age: 86
Setting detail: THERAPIES SERIES
End: 2021-05-20
Attending: INTERNAL MEDICINE
Payer: COMMERCIAL

## 2021-05-20 PROCEDURE — 95992 CANALITH REPOSITIONING PROC: CPT | Mod: GP

## 2021-05-20 PROCEDURE — 97112 NEUROMUSCULAR REEDUCATION: CPT | Mod: GP,59

## 2021-07-13 NOTE — PROGRESS NOTES
Outpatient Physical Therapy Discharge Note     Patient: Anibal Tenorio  : 1935    Beginning/End Dates of Reporting Period:  21 to 21 (3 visits through 21)    Referring Provider: Dr. Guillory    Therapy Diagnosis: L BPPV     Client Self Report: Been working on balance exercises and feeling pretty good, very slight sx.       Goals:  Goal Identifier standing   Goal Description Pt able to transfer from supine to standing without vertigo in 4 weeks.    Target Date 21   Date Met  21   Progress (detail required for progress note):     Goal Identifier quick movements   Goal Description Pt able to turn his head quickly for driving without vertigo in 4 weeks.    Target Date 21   Date Met  21   Progress (detail required for progress note):       Plan:  Discharge from therapy.    Discharge:    Reason for Discharge: Patient has met all goals.    Equipment Issued: na    Discharge Plan: Patient to continue home program.

## 2021-09-23 ENCOUNTER — OFFICE VISIT (OUTPATIENT)
Dept: PEDIATRICS | Facility: OTHER | Age: 86
End: 2021-09-23
Attending: INTERNAL MEDICINE
Payer: COMMERCIAL

## 2021-09-23 VITALS
BODY MASS INDEX: 25.8 KG/M2 | TEMPERATURE: 96.9 F | DIASTOLIC BLOOD PRESSURE: 76 MMHG | SYSTOLIC BLOOD PRESSURE: 128 MMHG | RESPIRATION RATE: 16 BRPM | OXYGEN SATURATION: 96 % | WEIGHT: 182.4 LBS | HEART RATE: 74 BPM

## 2021-09-23 DIAGNOSIS — K21.9 BILE ACID ESOPHAGEAL REFLUX: Primary | Chronic | ICD-10-CM

## 2021-09-23 DIAGNOSIS — I80.01 THROMBOPHLEBITIS OF SUPERFICIAL VEINS OF RIGHT LOWER EXTREMITY: ICD-10-CM

## 2021-09-23 DIAGNOSIS — I83.813 VARICOSE VEINS OF BOTH LOWER EXTREMITIES WITH PAIN: ICD-10-CM

## 2021-09-23 DIAGNOSIS — Z23 NEED FOR PROPHYLACTIC VACCINATION AND INOCULATION AGAINST INFLUENZA: ICD-10-CM

## 2021-09-23 PROCEDURE — G0463 HOSPITAL OUTPT CLINIC VISIT: HCPCS | Mod: 25

## 2021-09-23 PROCEDURE — G0008 ADMIN INFLUENZA VIRUS VAC: HCPCS

## 2021-09-23 PROCEDURE — 90662 IIV NO PRSV INCREASED AG IM: CPT

## 2021-09-23 PROCEDURE — G0463 HOSPITAL OUTPT CLINIC VISIT: HCPCS

## 2021-09-23 PROCEDURE — 99213 OFFICE O/P EST LOW 20 MIN: CPT | Performed by: INTERNAL MEDICINE

## 2021-09-23 RX ORDER — SUCRALFATE 1 G/1
1 TABLET ORAL 4 TIMES DAILY
Qty: 300 TABLET | Refills: 3 | Status: SHIPPED | OUTPATIENT
Start: 2021-09-23 | End: 2022-01-11

## 2021-09-23 ASSESSMENT — PAIN SCALES - GENERAL: PAINLEVEL: NO PAIN (0)

## 2021-09-23 NOTE — NURSING NOTE
"Chief Complaint   Patient presents with     Gastric Problem       FOOD SECURITY SCREENING QUESTIONS  Hunger Vital Signs:  Within the past 12 months we worried whether our food would run out before we got money to buy more. Never  Within the past 12 months the food we bought just didn't last and we didn't have money to get more. Never  Faustina Villa LPN 9/23/2021 2:11 PM      Initial /76 (BP Location: Right arm, Patient Position: Sitting, Cuff Size: Adult Regular)   Pulse 74   Temp 96.9  F (36.1  C) (Tympanic)   Resp 16   Wt 82.7 kg (182 lb 6.4 oz)   SpO2 96%   BMI 25.80 kg/m   Estimated body mass index is 25.8 kg/m  as calculated from the following:    Height as of 5/10/21: 1.791 m (5' 10.5\").    Weight as of this encounter: 82.7 kg (182 lb 6.4 oz).  Medication Reconciliation: complete    Faustina Villa LPN  "

## 2021-09-23 NOTE — NURSING NOTE
Immunization Documentation    Prior to Immunization administration, verified patients identity using patient's name and date of birth. Please see IMMUNIZATIONS  and order for additional information.  Patient / Parent instructed to remain in clinic for 15 minutes and report any adverse reaction to staff immediately.    Was the entire amount of vaccines given used? Yes    Faustina Villa LPN  9/23/2021   2:34 PM

## 2021-09-23 NOTE — PROGRESS NOTES
Subjective   Anibal Tenorio is a 85 year old male who presents for ongoing stomach issues.  Last winter he had an endoscopy for abdominal pain and was diagnosed with bile acid reflux.  He continues to have intermittent nausea and upset stomach.  Its not severe but enough to bother him.  He no longer takes sucralfate.  He had taken it once a day for about a month.  He wants to avoid medications to reduce acid.  He also has a painful area on his right thigh that you started a couple days ago.  He has varicose veins that do not usually bother him.    Objective   Vitals: /76 (BP Location: Right arm, Patient Position: Sitting, Cuff Size: Adult Regular)   Pulse 74   Temp 96.9  F (36.1  C) (Tympanic)   Resp 16   Wt 82.7 kg (182 lb 6.4 oz)   SpO2 96%   BMI 25.80 kg/m      Cardiovascular: Palpable cord distal right thigh with some warmth and slight erythema.  Prominent varicose veins along the medial aspect of the thighs bilaterally.    Review and Analysis of Data   I personally reviewed the following:  External notes: No  Results: No  Use of an independent historian: No  Independent review of a test performed by another physician: No  Discussion of management with another physician: No  Moderate risk of morbidity from additional diagnostic testing and/or treatment.    Assessment & Plan   1. Bile acid esophageal reflux  We had a lengthy discussion today with regard to bile acid reflux including pathophysiology, expected clinical course, potential interventions.  I educated him on the use of sucralfate.  I recommended a retrial.  If symptoms persist or worsen would request expert consultation.  - sucralfate (CARAFATE) 1 GM tablet; Take 1 tablet (1 g) by mouth 4 times daily  Dispense: 300 tablet; Refill: 3    2. Need for prophylactic vaccination and inoculation against influenza  - INFLUENZA, QUAD, HIGH DOSE, PF, 65YR + (FLUZONE HD)    3. Thrombophlebitis of superficial veins of right lower extremity  4. Varicose  veins of both lower extremities with pain  I believe his symptoms on the thigh are coming from a superficial thrombophlebitis likely of a thrombosed varicose vein.  Conservative treatments are recommended.  If symptoms persist or worsen could consider radiology consult for varicose vein treatments.    Patient Instructions    -- Retrial of sucralfate   -- Try to take it when your stomach will be empty the longest   -- Consider consult to General Surgery vs GI if symptoms persist     -- Flu shot     -- Try Voltaren/diclofenac gel   -- If varicose veins continue to bother would refer to Radiology for treatment    Patient Education     Superficial Thrombophlebitis   The superficial veins are the veins near the surface of the skin. Superficial thrombophlebitis is a problem that occurs when one or more of these veins become red, irritated, and swollen. This is most often because of a blood clot.   Causes  The problem may occur after injury to a vein. It may also occur after having an intravenous (IV) line placed. Other factors that can make the problem more likely include:     Varicose veins    Venous insufficiency    Bleeding disorders    Prolonged periods of rest and not moving around    IV drug abuse    Pregnancy    Use of birth control pills or estrogen therapy  Symptoms  Symptoms may appear in the affected area. They can include:    Pain    Tenderness    Redness    Warmth    Swelling    Hardening of the vein  In most cases, superficial thrombophlebitis resolves on its own with no problems. Treatment is focused on relieving symptoms.   Sometimes, there is a risk that the deep veins in the body may also be involved. This can lead to more serious problems. In such cases, further testing and treatments may be needed. Your healthcare provider can tell you more about this.   Home care  To help relieve pain and swelling, you may be told to:    Apply heat or cold to the affected area. Do this for up to 10 minutes as often as  directed.  ? Heat: Use a warm compress, such as a heating pad.  ? Cold: Use a cold compress, such as a cold pack or bag of ice wrapped in a thin towel.    Take nonsteroidal anti-inflammatory drugs (NSAIDS), such as ibuprofen. In some cases, other pain medicines may be prescribed.    Keep the affected limb (arm or leg) raised above heart level as directed.    Wear elastic compression stockings or bandages as directed.    Don't sit or stand for long periods. Get up and walk often.  To help treat a blood clot, a blood thinner (anticoagulant) may be prescribed. If this is needed, be sure to take the medicine exactly as directed.   Follow-up care  Follow up with your healthcare provider as advised. If imaging tests are done, they will be reviewed by a doctor. You ll be told the results and any new findings that may affect your treatment.   When to seek medical advice  Call your healthcare provider right away if any of these occur:    Fever of 100.4 F (38 C) or higher, or as directed by your healthcare provider    Increasing pain, swelling, or tenderness in the affected area    Spreading warmth or redness in the affected area  Call 911  Call 911 if any of these occur:     Trouble breathing    Chest pain or discomfort that worsens with deep breathing or coughing    Coughing (may cough up blood)    Fast or irregular heartbeat    Sweating    Anxiety    Lightheadedness, dizziness, or fainting    Extreme confusion    Extreme drowsiness or trouble waking up    New pain in the chest, arm, shoulder, neck, or upper back  [employer] last reviewed this educational content on 4/1/2018 2000-2021 [employer]. All rights reserved. This information is not intended as a substitute for professional medical care. Always follow your healthcare professional's instructions.               No follow-ups on file.    Signed, Derek Guillory MD, FAAP, FACP  Internal Medicine & Pediatrics

## 2021-09-23 NOTE — PATIENT INSTRUCTIONS
-- Retrial of sucralfate   -- Try to take it when your stomach will be empty the longest   -- Consider consult to General Surgery vs GI if symptoms persist     -- Flu shot     -- Try Voltaren/diclofenac gel   -- If varicose veins continue to bother would refer to Radiology for treatment    Patient Education     Superficial Thrombophlebitis   The superficial veins are the veins near the surface of the skin. Superficial thrombophlebitis is a problem that occurs when one or more of these veins become red, irritated, and swollen. This is most often because of a blood clot.   Causes  The problem may occur after injury to a vein. It may also occur after having an intravenous (IV) line placed. Other factors that can make the problem more likely include:     Varicose veins    Venous insufficiency    Bleeding disorders    Prolonged periods of rest and not moving around    IV drug abuse    Pregnancy    Use of birth control pills or estrogen therapy  Symptoms  Symptoms may appear in the affected area. They can include:    Pain    Tenderness    Redness    Warmth    Swelling    Hardening of the vein  In most cases, superficial thrombophlebitis resolves on its own with no problems. Treatment is focused on relieving symptoms.   Sometimes, there is a risk that the deep veins in the body may also be involved. This can lead to more serious problems. In such cases, further testing and treatments may be needed. Your healthcare provider can tell you more about this.   Home care  To help relieve pain and swelling, you may be told to:    Apply heat or cold to the affected area. Do this for up to 10 minutes as often as directed.  ? Heat: Use a warm compress, such as a heating pad.  ? Cold: Use a cold compress, such as a cold pack or bag of ice wrapped in a thin towel.    Take nonsteroidal anti-inflammatory drugs (NSAIDS), such as ibuprofen. In some cases, other pain medicines may be prescribed.    Keep the affected limb (arm or leg)  raised above heart level as directed.    Wear elastic compression stockings or bandages as directed.    Don't sit or stand for long periods. Get up and walk often.  To help treat a blood clot, a blood thinner (anticoagulant) may be prescribed. If this is needed, be sure to take the medicine exactly as directed.   Follow-up care  Follow up with your healthcare provider as advised. If imaging tests are done, they will be reviewed by a doctor. You ll be told the results and any new findings that may affect your treatment.   When to seek medical advice  Call your healthcare provider right away if any of these occur:    Fever of 100.4 F (38 C) or higher, or as directed by your healthcare provider    Increasing pain, swelling, or tenderness in the affected area    Spreading warmth or redness in the affected area  Call 911  Call 911 if any of these occur:     Trouble breathing    Chest pain or discomfort that worsens with deep breathing or coughing    Coughing (may cough up blood)    Fast or irregular heartbeat    Sweating    Anxiety    Lightheadedness, dizziness, or fainting    Extreme confusion    Extreme drowsiness or trouble waking up    New pain in the chest, arm, shoulder, neck, or upper back  [employer] last reviewed this educational content on 4/1/2018 2000-2021 [employer]. All rights reserved. This information is not intended as a substitute for professional medical care. Always follow your healthcare professional's instructions.

## 2021-10-04 ENCOUNTER — IMMUNIZATION (OUTPATIENT)
Dept: FAMILY MEDICINE | Facility: OTHER | Age: 86
End: 2021-10-04
Attending: FAMILY MEDICINE
Payer: COMMERCIAL

## 2021-10-04 PROCEDURE — 91300 PR COVID VAC PFIZER DIL RECON 30 MCG/0.3 ML IM: CPT

## 2021-11-23 NOTE — PROGRESS NOTES
" 11/28/18 0800   General Information   Type of Visit Initial OP Ortho PT Evaluation   Start of Care Date 11/28/18   Ref[facility] Phys[facility]   Orders Evaluate and Treat   Orders Comment 8 visits autorized in 180 days   Date of Order 11/21/18   Insurance Type Other   Ins[facility] Comments/Visits [facility]   Medical Diagnosis Primary OA right knee   Precautions/Limitations no known precautions/limitations   Body Part(s)   Body Part(s) Knee   Presentation and Etiology   Pertinent history of current problem (include personal factors and/or comorbidities that impact the POC) Patient is a 83 y/o male whom presents to PT with c/o right knee stiffness, pain and swelling. Reports his pain and stiffness is more significant in the early morning and after prolonged standing/walking. No past Hx of right knee surgery.. He did recieve a left knee menisectomy in 2012. Reports his left knee is \"good\" now. Patient is also recieving PT ytreatment for right shoulder pain/RC impingement currently. 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  Past Medical history is remarkable for multiple joints affected with OA.   Impairments A. Pain;C. Swelling;E. Decreased flexibility;D. Decreased ROM;H. Impaired gait   Symptom Location Right knee general   How/Where did it occur From insidious onset   Onset date of current episode/exacerbation 11/21/18   Chronicity Chronic   Pain rating (0-10 point scale) Best (/10);Worst (/10)   Best (/10) 0   Worst (/10) 4-5/10   Pain quality C. Aching   Frequency of pain/symptoms C. With activity   Pain/symptoms are: Worse in the morning   Pain/symptoms exacerbated by B. Walking;D. Carrying;G. Certain positions   Pain/symptoms eased by A. Sitting;C. Rest;E. Changing positions;H. Cold;I. OTC medication(s)   Progression of symptoms since onset: Worsened   Current / Previous Interventions   Diagnostic Tests: X-ray   X-ray Results Results   X-ray results Not " "available at this facility. Will request [facility]   Prior Level of Function   Prior Level of Function-Mobility Patient is able to complete all functional task despite intermittent knee pain   Prior Level of Function-ADLs As above   Current Level of Function   Current Community Support Family/friend caregiver   Patient role/employment history F. Retired   Living environment [place]   Fall Risk Screen   Fall screen completed by PT   Have you fallen 2 or more times in the past year? No   Have you fallen and had an injury in the past year? No   Is patient a fall risk? No   Abuse Risk Screen   QUESTION TO PATIENT:  Has a member of your family or a partner(now or in the past) intimidated, hurt, manipulated, or controlled you in any way? no   QUESTION TO PATIENT: Do you feel safe going back to the place where you are living? yes   OBSERVATION: Is there reason to believe there has been maltreatment of a vulnerable adult (ie. Physical/Sexual/Emotional abuse, self neglect, lack of adequate food, shelter, medical care, or financial exploitation)? no   Knee Objective Findings   Side (if bilateral, select both right and left) Right   Observation Slight varus positioning   Integumentary  NML   Posture NML   Gait/Locomotion Slight antalgia with first steps after sitting   Balance/Proprioception (Single Leg Stance) 20\"   Knee/Hip Strength Comments Slight limits to knee extension strength due to knee pain   Varus Stress Test Mild POS   Rona's Test Mild POS   Apley's Test POS   External Rotation Test POS   Knee Special Test Comments Probable medial meniscal lesion   Palpation 2-4/4 tenderness of the medial joint line   Right Knee Extension AROM -7   Right Knee Extension PROM -5   Right Knee Flexion AROM 118   Right Knee Flexion PROM 120   Right Knee Flexion Strength 5   Right Knee Extension Strength 4+   Right Hip Abduction Strength 4   Right Quad Set Strength 4+   R VMO Strength 4+   Right Gastrocnemius Flexibility -7 deg "   Right Hamstring Flexibility -15 deg   Right Hip Flexor Flexibility Minor limits   Right Quadricep Flexibility Minor limits   Right ITB Flexibility NML   Planned Therapy Interventions   Planned Therapy Interventions joint mobilization;manual therapy;ROM;strengthening;stretching   Planned Modality Interventions   Planned Modality Interventions Cryotherapy;Hot packs;Ultrasound   Clinical Impression   Criteria for Skilled Therapeutic Interventions Met yes, treatment indicated   PT Diagnosis Gait impairment due to right knee pain, limited mobility   Functional limitations due to impairments Difficulties with gait and tasks requiring long duration standing   Clinical Presentation Stable/Uncomplicated   Clinical Decision Making (Complexity) Low complexity   Therapy Frequency 2 times/Week   Predicted Duration of Therapy Intervention (days/wks) 6-8 weeks   Risk & Benefits of therapy have been explained Yes   Patient, Family & other staff in agreement with plan of care Yes   Clinical Impression Comments Developing/advancing knee pain due to OA. Likely degenerative tear of right medial meniscus   Education Assessment   Preferred Learning Style Listening;Reading;Demonstration;Pictures/video   Barriers to Learning No barriers   ORTHO GOALS   PT Ortho Eval Goals 1;2;3   Ortho Goal 1   Goal Identifier Pain   Goal Description Patient will report that he is able to complete community level gait as desired without limitation due to pain greater than 1-2/10 in 6-8 weeks   Target Date 01/28/19   Ortho Goal 2   Goal Identifier Mobility   Goal Description Patient will demo full right knee mobility supporting improved ability to complete tasks such as lifting/carrying and work in full knee flexion and gait with full knee extension in 6-8 weeks   Target Date 01/28/19   Ortho Goal 3   Goal Identifier Strength   Goal Description Patient will demo full MMT strength in knee, hip muscle groups supporting knee stability during gait and home  tasks requiring knee bending in 8 weeks   Target Date 01/28/19   Total Evaluation Time   Total Evaluation Time 35      Trilobed Flap Text: The defect edges were debeveled with a #15 scalpel blade.  Given the location of the defect and the proximity to free margins a trilobed flap was deemed most appropriate.  Using a sterile surgical marker, an appropriate trilobed flap drawn around the defect.    The area thus outlined was incised deep to adipose tissue with a #15 scalpel blade.  The skin margins were undermined to an appropriate distance in all directions utilizing iris scissors.

## 2021-12-03 ENCOUNTER — OFFICE VISIT (OUTPATIENT)
Dept: URBAN - METROPOLITAN AREA CLINIC 13 | Facility: CLINIC | Age: 86
End: 2021-12-03
Payer: COMMERCIAL

## 2021-12-03 DIAGNOSIS — H43.811 VITREOUS DEGENERATION OF RIGHT EYE: ICD-10-CM

## 2021-12-03 PROCEDURE — 99213 OFFICE O/P EST LOW 20 MIN: CPT | Performed by: OPHTHALMOLOGY

## 2021-12-03 PROCEDURE — 67028 INJECTION EYE DRUG: CPT | Performed by: OPHTHALMOLOGY

## 2021-12-03 PROCEDURE — 92134 CPTRZ OPH DX IMG PST SGM RTA: CPT | Performed by: OPHTHALMOLOGY

## 2021-12-03 ASSESSMENT — INTRAOCULAR PRESSURE
OS: 16
OD: 17

## 2021-12-03 NOTE — IMPRESSION/PLAN
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 Plan: He returns for the winter and his last injection was 8/31/21. He's doing well from a retina standpoint. OCT shows few cysts OD and PED OS. We discussed the findings and natural history. Discussed observation vs treatment and the R/B of each. Based on today's findings, I recommend treatment. Eylea injected OD without complication after discussing the R/IB/A in detail. He will f/u with his retina specialist in [place] in 2-3 months.  
RTC PRN; OCT OU; poss eylea OD

## 2022-01-11 ENCOUNTER — OFFICE VISIT (OUTPATIENT)
Dept: PEDIATRICS | Facility: OTHER | Age: 87
End: 2022-01-11
Attending: INTERNAL MEDICINE
Payer: COMMERCIAL

## 2022-01-11 VITALS
SYSTOLIC BLOOD PRESSURE: 110 MMHG | BODY MASS INDEX: 25.82 KG/M2 | HEART RATE: 70 BPM | WEIGHT: 182.5 LBS | RESPIRATION RATE: 16 BRPM | OXYGEN SATURATION: 97 % | DIASTOLIC BLOOD PRESSURE: 70 MMHG | TEMPERATURE: 96.3 F

## 2022-01-11 DIAGNOSIS — L73.9 FOLLICULITIS: Primary | ICD-10-CM

## 2022-01-11 PROCEDURE — 99213 OFFICE O/P EST LOW 20 MIN: CPT | Performed by: INTERNAL MEDICINE

## 2022-01-11 PROCEDURE — G0463 HOSPITAL OUTPT CLINIC VISIT: HCPCS

## 2022-01-11 RX ORDER — SILVER SULFADIAZINE 10 MG/G
CREAM TOPICAL DAILY
Qty: 85 G | Refills: 1 | Status: SHIPPED | OUTPATIENT
Start: 2022-01-11

## 2022-01-11 ASSESSMENT — PAIN SCALES - GENERAL: PAINLEVEL: NO PAIN (0)

## 2022-01-11 NOTE — PROGRESS NOTES
Subjective   Anibal Tenorio is a 86 year old male who presents for a Derm question.  He has a spot on his left upper cheek.  It started out with a scaly and crusty patch.  It has been healing but very slowly over the course of the last month.  He has been using a topical emollient.  He also has had a little bit of a cold but he thinks it starting to get better.  No fevers or body aches.  No rash.  Slight rhinorrhea is present.  He has received all 3 COVID-19 doses, and the influenza vaccine.    Objective   Vitals: /70 (BP Location: Right arm, Patient Position: Sitting, Cuff Size: Adult Regular)   Pulse 70   Temp (!) 96.3  F (35.7  C) (Tympanic)   Resp 16   Wt 82.8 kg (182 lb 8 oz)   SpO2 97%   BMI 25.82 kg/m      Skin: Small area of erythema left upper cheek area with a small central denuded spot.  No induration.        Assessment & Plan   1. Folliculitis  I recommend use of either Silvadene or triple over-the-counter antibiotic ointment.  If symptoms persist or worsen would consider biopsy either locally versus dermatology referral to exclude skin cancer.  - silver sulfADIAZINE (SILVADENE) 1 % external cream; Apply topically daily  Dispense: 85 g; Refill: 1    Signed, Derek Guillory MD, FAAP, FACP  Internal Medicine & Pediatrics

## 2022-07-07 ENCOUNTER — OFFICE VISIT (OUTPATIENT)
Dept: FAMILY MEDICINE | Facility: OTHER | Age: 87
End: 2022-07-07
Attending: PHYSICIAN ASSISTANT
Payer: COMMERCIAL

## 2022-07-07 ENCOUNTER — HOSPITAL ENCOUNTER (OUTPATIENT)
Dept: GENERAL RADIOLOGY | Facility: OTHER | Age: 87
Discharge: HOME | End: 2022-07-07
Attending: NURSE PRACTITIONER
Payer: COMMERCIAL

## 2022-07-07 VITALS
DIASTOLIC BLOOD PRESSURE: 72 MMHG | TEMPERATURE: 99.1 F | BODY MASS INDEX: 24.14 KG/M2 | RESPIRATION RATE: 17 BRPM | HEART RATE: 79 BPM | SYSTOLIC BLOOD PRESSURE: 112 MMHG | HEIGHT: 72 IN | WEIGHT: 178.2 LBS | OXYGEN SATURATION: 98 %

## 2022-07-07 DIAGNOSIS — J06.9 VIRAL URI WITH COUGH: ICD-10-CM

## 2022-07-07 DIAGNOSIS — U07.1 INFECTION DUE TO 2019 NOVEL CORONAVIRUS: ICD-10-CM

## 2022-07-07 DIAGNOSIS — R05.3 PERSISTENT COUGH: Primary | ICD-10-CM

## 2022-07-07 PROCEDURE — 99213 OFFICE O/P EST LOW 20 MIN: CPT | Performed by: NURSE PRACTITIONER

## 2022-07-07 PROCEDURE — U0005 INFEC AGEN DETEC AMPLI PROBE: HCPCS | Mod: ZL | Performed by: NURSE PRACTITIONER

## 2022-07-07 PROCEDURE — C9803 HOPD COVID-19 SPEC COLLECT: HCPCS | Performed by: NURSE PRACTITIONER

## 2022-07-07 PROCEDURE — G0463 HOSPITAL OUTPT CLINIC VISIT: HCPCS | Mod: 25

## 2022-07-07 PROCEDURE — G0463 HOSPITAL OUTPT CLINIC VISIT: HCPCS

## 2022-07-07 PROCEDURE — 71046 X-RAY EXAM CHEST 2 VIEWS: CPT

## 2022-07-07 ASSESSMENT — PAIN SCALES - GENERAL: PAINLEVEL: NO PAIN (0)

## 2022-07-07 NOTE — PROGRESS NOTES
ASSESSMENT/PLAN:    I have reviewed the nursing notes.  I have reviewed the findings, diagnosis, plan and need for follow up with the patient.    1. Persistent cough  Clear chest xr. covid positive.   - XR Chest 2 Views  - Symptomatic; Unknown COVID-19 Virus (Coronavirus) by PCR Nose    2. Viral URI with cough  3. Infection due to 2019 novel coronavirus  covid positive. Treat symptomatically. As symptoms started 7 days ago, he is beyond the timeframe for antiviral treatment to be helpful/successful as discussed with Anibal and his friend in office visit today. Exam and vitals are reassuring.   -Symptomatic treatment - Encouraged fluids, salt water gargles, honey (only if greater than 1 year in age due to risk of botulism), elevation, humidifier, sinus rinse/netti pot, lozenges, tea, topical vapor rub, popsicles, rest, etc     Follow up if symptoms persist or worsen or concerns    I explained my diagnostic considerations and recommendations to the patient, who voiced understanding and agreement with the treatment plan. All questions were answered. We discussed potential side effects of any prescribed or recommended therapies, as well as expectations for response to treatments.    Marly Lopez NP  7/7/2022  11:50 AM    HPI:  Anibal Tenorio is a 86 year old male who presents to [facility] today for concerns of cold symptoms, worsening over past 1 week. Has a cough mostly dry. Feels like he is gettng worse. Denies shortness of breath. Symptoms include sore throat, sneezing, runny nose. No tests for covid. No known exposures. Has not been out and about much other than errands, grocery store. No known fevers/chills. No body aches, sinus pressure.     Cough sometimes wakes him up at night.  Does not feel well, somewhat lethargic.     Also dry scaley itchy patch of skin on left foreram, using vanicream. Has had it for months.     Past Medical History:   Diagnosis Date     Chronic rhinitis     No Comments Provided      Esophageal reflux     No Comments Provided     Osteoarthrosis involving lower leg     Bilateral     Vasectomy status     1970     Past Surgical History:   Procedure Laterality Date     ARTHROSCOPY SHOULDER      ,Left shoulder     ARTHROSCOPY SHOULDER      ,Right shoulder     COLONOSCOPY      circa , prior history of polyps. In [place]     COLONOSCOPY      2015,Extensive diverticulosis - maintain high fiber diet     ESOPHAGOSCOPY, GASTROSCOPY, DUODENOSCOPY (EGD), COMBINED      last EGD circa . In [place]     Social History     Tobacco Use     Smoking status: Former Smoker     Packs/day: 0.50     Years: 20.00     Pack years: 10.00     Types: Cigarettes     Quit date: 1970     Years since quittin.5     Smokeless tobacco: Never Used     Tobacco comment: Quit smoking: coffee 3/d   Substance Use Topics     Alcohol use: Yes     Alcohol/week: 14.0 standard drinks     Comment: Daily- evenings     Current Outpatient Medications   Medication Sig Dispense Refill     ascorbic acid (VITAMIN C) 1000 MG TABS Take 1,000 mg by mouth daily       Calcium Carb-Cholecalciferol 1000-800 MG-UNIT TABS Take 1 tablet by mouth daily       fish oil-omega-3 fatty acids 1000 MG capsule Take 1 capsule by mouth daily       Glucosamine-Chondroit-Vit C-Mn (GLUCOSAMINE 1500 COMPLEX) CAPS Take 1 capsule by mouth 3 times daily       L-LYSINE HCL PO Take 1,000 mg by mouth daily       propylene glycol (SYSTANE BALANCE) 0.6 % SOLN ophthalmic solution Inject 1 drop into the eye       silver sulfADIAZINE (SILVADENE) 1 % external cream Apply topically daily 85 g 1     Turmeric 400 MG CAPS Take 1 capsule by mouth daily       No Known Allergies  Past medical history, past surgical history, current medications and allergies reviewed and accurate to the best of my knowledge.      ROS:  Refer to HPI    /72   Pulse 79   Temp 99.1  F (37.3  C) (Tympanic)   Resp 17   Ht 1.829 m (6')   Wt 80.8 kg (178 lb 3.2 oz)   SpO2 98%   BMI 24.17  kg/m      EXAM:  General Appearance: Well appearing 86 year old male, appropriate appearance for age. No acute distress   Ears: Left TM intact, translucent with bony landmarks appreciated, no erythema, no effusion, no bulging, no purulence.  Right TM intact, translucent with bony landmarks appreciated, no erythema, no effusion, no bulging, no purulence.  Left auditory canal clear.  Right auditory canal clear.  Normal external ears, non tender.  Eyes: conjunctivae normal without erythema or irritation, corneas clear, no drainage or crusting, no eyelid swelling, pupils equal   Oropharynx: moist mucous membranes, posterior pharynx without erythema, tonsils symmetric, no erythema, no exudates or petechiae, no post nasal drip seen, no trismus, voice clear.    Sinuses:  No sinus tenderness upon palpation of the frontal or maxillary sinuses  Nose:  Bilateral nares: no erythema, no edema, + clear rhinorrhea   Neck: supple without adenopathy  Respiratory: normal chest wall and respirations.  Normal effort.  Clear to auscultation bilaterally, no wheezing, crackles or rhonchi.  No increased work of breathing.  + nonproductive occasional cough appreciated.  Cardiac: RRR with no murmurs  Psychological: normal affect, alert, oriented, and pleasant.     Results for orders placed or performed in visit on 07/07/22   XR Chest 2 Views     Status: None    Narrative    XR CHEST 2 VW    HISTORY: 86 years Male cough x 1 week, fatigue, worsening cough no  improvement. r/o pneumonia.; Persistent cough    COMPARISON: None    TECHNIQUE: 2 views of the chest were obtained.    FINDINGS: Two views of the chest were obtained. Heart size and  pulmonary vascularity are within normal limits, lungs are clear on  both views. No consolidating air space opacities are present.    A few pleural calcifications are present.      Impression    IMPRESSION: Clear chest.    THOR WALDRON MD         SYSTEM ID:  [id number]   Symptomatic; Unknown COVID-19 Virus  (Coronavirus) by PCR Nose     Status: Abnormal    Specimen: Nose; Swab   Result Value Ref Range    SARS CoV2 PCR Positive (A) Negative, Testing sent to reference lab. Results will be returned via unsolicited result    Narrative    Testing was performed using the francesco SARS-CoV-2 assay on the francesco  Aipai0 System. This test should be ordered for the detection of  SARS-CoV-2 in individuals who meet SARS-CoV-2 clinical and/or  epidemiological criteria. Test performance is unknown in asymptomatic  patients. This test is for in vitro diagnostic use under the FDA EUA  for laboratories certified under CLIA to perform high and/or moderate  complexity testing. This test has not been FDA cleared or approved. A  negative result does not rule out the presence of PCR inhibitors in  the specimen or target RNA in concentration below the limit of  detection for the assay. The possibility of a false negative should  be considered if the patient's recent exposure or clinical  presentation suggests COVID-19. This test was validated by the [facility]. This  laboratory is certified under the Clinical Laboratory Improvement  Amendments of 1988 (CLIA-88) as qualified to perform high and/or  moderate complexity laboratory testing.

## 2022-07-07 NOTE — NURSING NOTE
Chief Complaint   Patient presents with     Cold Symptoms       Initial /72   Pulse 79   Temp 99.1  F (37.3  C) (Tympanic)   Resp 17   Ht 1.829 m (6')   Wt 80.8 kg (178 lb 3.2 oz)   SpO2 98%   BMI 24.17 kg/m   Estimated body mass index is 24.17 kg/m  as calculated from the following:    Height as of this encounter: 1.829 m (6').    Weight as of this encounter: 80.8 kg (178 lb 3.2 oz).  Medication Reconciliation: complete    FOOD SECURITY SCREENING QUESTIONS  Hunger Vital Signs:  Within the past 12 months we worried whether our food would run out before we got money to buy more. Never  Within the past 12 months the food we bought just didn't last and we didn't have money to get more. Never  Apolonia Morejon LPN 7/7/2022 11:45 AM

## 2022-07-07 NOTE — PATIENT INSTRUCTIONS
LOTRIMIN cream topically (can purchase over the counter) for suspected fungal rash of left forearm.     Covid test pending.     Chest xray performed today was CLEAR. This is good news. I do not see anything concerning on exam; symptoms are most consistent with viral illness. Recommend increase fluids, treat symptomatically. Follow up if worsening condition over next 1 week.

## 2022-07-08 ENCOUNTER — TELEPHONE (OUTPATIENT)
Dept: NURSING | Facility: CLINIC | Age: 87
End: 2022-07-08

## 2022-07-08 LAB — SARS-COV-2 RNA RESP QL NAA+PROBE: POSITIVE

## 2022-07-08 NOTE — TELEPHONE ENCOUNTER
Coronavirus (COVID-19) Notification    Caller Name (Patient, parent, daughter/son, grandparent, etc)  patient    Reason for call  Notify of Positive Coronavirus (COVID-19) lab results, assess symptoms,  review [facility] recommendations    Lab Result    Lab test:  2019-nCoV rRt-PCR or SARS-CoV-2 PCR    Oropharyngeal AND/OR nasopharyngeal swabs is POSITIVE for 2019-nCoV RNA/SARS-COV-2 PCR (COVID-19 virus)      Gather patient reported symptoms   Assessment   Current Symptoms at time of phone call, reported by patient: (if no symptoms, document: No symptoms] Fatigue, sore throat, congestion, and cough   Date of symptom(s) onset (if applicable) For a few weeks     If at time of call, Patients symptoms have worsened, the Patient should contact 911 or have someone drive them to Emergency Dept promptly:      If Patient calling 911, inform 911 personal that you have tested positive for the Coronavirus (COVID-19).  Place mask on and await 911 to arrive.    If Emergency Dept, If possible, please have another adult drive you to the Emergency Dept but you need to wear mask when in contact with other people.      Treatment Options:   Patient classified as COVID treatment eligible by Epic high risk algorithm: Yes  Is the patient symptomatic at the time of result notification? Yes. Was the onset of symptoms within the last 5 days? No. Was the onset of symptoms within the last 7 days? No.   Refused virtual with provider  Review information with Patient    Your result was positive. This means you have COVID-19 (coronavirus).    How can I protect others?    These guidelines are for isolating before returning to work, school or .    If you DO have symptoms    Stay home and away from others     For at least 5 days after your symptoms started, AND    You are fever free for 24 hours (with no medicine that reduces fever), AND    Your other symptoms are better    Wear a mask for 10 full days anytime you are around  others    If you DON'T have symptoms    Stay home and away from others for at least 5 days after your positive test    Wear a mask for 10 full days anytime you are around others    There may be different guidelines for healthcare facilities.  Please check with the specific sites before arriving.    If you have been told by a doctor that you were severely ill with COVID-19 or are immunocompromised, you should isolate for at least 10 days.    You should not go back to work until you meet the guidelines above for ending your home isolation. You don't need to be retested for COVID-19 before going back to work--studies show that you won't spread the virus if it's been at least 10 days since your symptoms started (or 20 days, if you have a weak immune system).    Employers, schools, and daycares: This is an official notice for this person's medical guidelines for returning in-person.  They must meet the above guidelines before going back to work, school or  in person.    You will receive a positive COVID-19 letter via [employer] or the mail soon with additional self-care information (exception, no letters will be sent to presurgical/preprocedure patients).    Would you like me to review some of that information with you now?  No    If you were tested for an upcoming procedure, please contact your provider for next steps.    Maryanne Chan

## 2022-07-15 ENCOUNTER — ALLIED HEALTH/NURSE VISIT (OUTPATIENT)
Dept: FAMILY MEDICINE | Facility: OTHER | Age: 87
End: 2022-07-15
Attending: FAMILY MEDICINE
Payer: COMMERCIAL

## 2022-07-15 DIAGNOSIS — Z20.822 COVID-19 RULED OUT: Primary | ICD-10-CM

## 2022-07-15 PROCEDURE — U0003 INFECTIOUS AGENT DETECTION BY NUCLEIC ACID (DNA OR RNA); SEVERE ACUTE RESPIRATORY SYNDROME CORONAVIRUS 2 (SARS-COV-2) (CORONAVIRUS DISEASE [COVID-19]), AMPLIFIED PROBE TECHNIQUE, MAKING USE OF HIGH THROUGHPUT TECHNOLOGIES AS DESCRIBED BY CMS-2020-01-R: HCPCS | Mod: ZL

## 2022-07-15 PROCEDURE — C9803 HOPD COVID-19 SPEC COLLECT: HCPCS

## 2022-07-15 NOTE — PROGRESS NOTES
Patient here today for Covid test. Tested positive 7 days ago and wanted to see if still positive.     Apolonia Bentley CNA .............................on 7/15/2022 at 10:21 AM

## 2022-07-16 LAB — SARS-COV-2 RNA RESP QL NAA+PROBE: POSITIVE

## 2022-07-19 ENCOUNTER — TELEPHONE (OUTPATIENT)
Dept: NURSING | Facility: CLINIC | Age: 87
End: 2022-07-19

## 2022-07-19 NOTE — TELEPHONE ENCOUNTER
Coronavirus (COVID-19) Notification    Caller Name (Patient, parent, daughter/son, grandparent, etc)  Anibal    Reason for call  Notify of Positive Coronavirus (COVID-19) lab results, assess symptoms,  review [facility] recommendations    Lab Result    Lab test:  2019-nCoV rRt-PCR or SARS-CoV-2 PCR    Oropharyngeal AND/OR nasopharyngeal swabs is POSITIVE for 2019-nCoV RNA/SARS-COV-2 PCR (COVID-19 virus)      Gather patient reported symptoms   Assessment   Current Symptoms at time of phone call, reported by patient: (if no symptoms, document: No symptoms] No symptoms   Date of symptom(s) onset (if applicable) 7/05/2022(2nd positive test)     If at time of call, Patients symptoms have worsened, the Patient should contact 911 or have someone drive them to Emergency Dept promptly:      If Patient calling 911, inform 911 personal that you have tested positive for the Coronavirus (COVID-19).  Place mask on and await 911 to arrive.    If Emergency Dept, If possible, please have another adult drive you to the Emergency Dept but you need to wear mask when in contact with other people.      Treatment Options:   Patient classified as COVID treatment eligible by Epic high risk algorithm: Yes  Is the patient symptomatic at the time of result notification? No    Review information with Patient    Your result was positive. This means you have COVID-19 (coronavirus).    How can I protect others?    These guidelines are for isolating before returning to work, school or .    If you DO have symptoms    Stay home and away from others     For at least 5 days after your symptoms started, AND    You are fever free for 24 hours (with no medicine that reduces fever), AND    Your other symptoms are better    Wear a mask for 10 full days anytime you are around others    If you DON'T have symptoms    Stay home and away from others for at least 5 days after your positive test    Wear a mask for 10 full days anytime you are around  others    There may be different guidelines for healthcare facilities.  Please check with the specific sites before arriving.    If you have been told by a doctor that you were severely ill with COVID-19 or are immunocompromised, you should isolate for at least 10 days.    You should not go back to work until you meet the guidelines above for ending your home isolation. You don't need to be retested for COVID-19 before going back to work--studies show that you won't spread the virus if it's been at least 10 days since your symptoms started (or 20 days, if you have a weak immune system).    Employers, schools, and daycares: This is an official notice for this person's medical guidelines for returning in-person.  They must meet the above guidelines before going back to work, school or  in person.    You will receive a positive COVID-19 letter via [employer] or the mail soon with additional self-care information.    Would you like me to review some of that information with you now?  No    If you were tested for an upcoming procedure, please contact your provider for next steps.    Marylu Bailey

## 2022-07-26 ENCOUNTER — OFFICE VISIT (OUTPATIENT)
Dept: FAMILY MEDICINE | Facility: OTHER | Age: 87
End: 2022-07-26
Attending: NURSE PRACTITIONER
Payer: COMMERCIAL

## 2022-07-26 VITALS
HEART RATE: 84 BPM | OXYGEN SATURATION: 95 % | DIASTOLIC BLOOD PRESSURE: 64 MMHG | WEIGHT: 180 LBS | SYSTOLIC BLOOD PRESSURE: 112 MMHG | RESPIRATION RATE: 16 BRPM | HEIGHT: 72 IN | BODY MASS INDEX: 24.38 KG/M2 | TEMPERATURE: 98.4 F

## 2022-07-26 DIAGNOSIS — R11.0 NAUSEA: Primary | ICD-10-CM

## 2022-07-26 PROCEDURE — 99213 OFFICE O/P EST LOW 20 MIN: CPT | Performed by: NURSE PRACTITIONER

## 2022-07-26 PROCEDURE — G0463 HOSPITAL OUTPT CLINIC VISIT: HCPCS

## 2022-07-26 RX ORDER — ONDANSETRON 4 MG/1
4 TABLET, ORALLY DISINTEGRATING ORAL EVERY 8 HOURS PRN
Qty: 10 TABLET | Refills: 0 | Status: SHIPPED | OUTPATIENT
Start: 2022-07-26 | End: 2022-07-29

## 2022-07-26 ASSESSMENT — PAIN SCALES - GENERAL: PAINLEVEL: NO PAIN (0)

## 2022-07-26 NOTE — PROGRESS NOTES
ASSESSMENT/PLAN:    I have reviewed the nursing notes.  I have reviewed the findings, diagnosis, plan and need for follow up with the patient.    1. Montez Barnett had a positive COVID test on 7 July, his nausea started shortly after that.  Today his exam is reassuring without abdominal tenderness or fevers.  He denies any vomiting, just a constant nauseous feeling.  He is agreeable to trying prescription Zofran for nausea.  Vitals are reassuring.  I do not feel any lab work is warranted today and I suspect this is likely related to recent COVID-19 infection.  I [facility] event that he does not have any improvement of symptoms in the next week.  Certainly follow-up sooner here in the emergency room if he develops any fevers, diarrhea, abdominal pain or any worsening condition in the meantime.  Also explained to him that it is too late for antiviral medication as he inquired about this.  - ondansetron (ZOFRAN ODT) 4 MG ODT tab; Take 1 tablet (4 mg) by mouth every 8 hours as needed for nausea  Dispense: 10 tablet; Refill: 0    Follow up if symptoms persist or worsen or concerns    I explained my diagnostic considerations and recommendations to the patient, who voiced understanding and agreement with the treatment plan. All questions were answered. We discussed potential side effects of any prescribed or recommended therapies, as well as expectations for response to treatments.    Marly Lopez NP  7/26/2022  10:19 AM    HPI:  Anibal Tenorio is a 86 year old male who presents to [facility] today for concerns of nausea that started 7-10 days ago. Denies diarrhea, vomiting, and fevers. He was positive for covid 19 recently, tested positive on 7/7/22. He has decrease in appetite as well. His covid symptoms ( cough ) has resolved.   Reports the nausea lasts almost all day long.   pepto bismol helped a little bit.   He is inquiring if antiviral medicine would help  him.  Denies dizziness, lightheadedness.   [facility] Gets [facility] physical exams.  Denies abdominal pain.   Last BM was this morning.       Past Medical History:   Diagnosis Date     Chronic rhinitis     No Comments Provided     Esophageal reflux     No Comments Provided     Osteoarthrosis involving lower leg     Bilateral     Vasectomy status          Past Surgical History:   Procedure Laterality Date     ARTHROSCOPY SHOULDER      ,Left shoulder     ARTHROSCOPY SHOULDER      ,Right shoulder     COLONOSCOPY      circa , prior history of polyps. In [place]     COLONOSCOPY      2015,Extensive diverticulosis - maintain high fiber diet     ESOPHAGOSCOPY, GASTROSCOPY, DUODENOSCOPY (EGD), COMBINED      last EGD circa . In [place]     Social History     Tobacco Use     Smoking status: Former Smoker     Packs/day: 0.50     Years: 20.00     Pack years: 10.00     Types: Cigarettes     Quit date: 1970     Years since quittin.6     Smokeless tobacco: Never Used     Tobacco comment: Quit smoking: coffee 3/d   Substance Use Topics     Alcohol use: Yes     Alcohol/week: 14.0 standard drinks     Comment: Daily- evenings     Current Outpatient Medications   Medication Sig Dispense Refill     ascorbic acid (VITAMIN C) 1000 MG TABS Take 1,000 mg by mouth daily       Calcium Carb-Cholecalciferol 1000-800 MG-UNIT TABS Take 1 tablet by mouth daily       fish oil-omega-3 fatty acids 1000 MG capsule Take 1 capsule by mouth daily       Glucosamine-Chondroit-Vit C-Mn (GLUCOSAMINE 1500 COMPLEX) CAPS Take 1 capsule by mouth 3 times daily       L-LYSINE HCL PO Take 1,000 mg by mouth daily       propylene glycol (SYSTANE BALANCE) 0.6 % SOLN ophthalmic solution Inject 1 drop into the eye       silver sulfADIAZINE (SILVADENE) 1 % external cream Apply topically daily 85 g 1     Turmeric 400 MG CAPS Take 1 capsule by mouth daily       No Known Allergies  Past medical history, past surgical history,  current medications and allergies reviewed and accurate to the best of my knowledge.      ROS:  Refer to HPI    /64   Pulse 84   Temp 98.4  F (36.9  C) (Tympanic)   Resp 16   Ht 1.829 m (6')   Wt 81.6 kg (180 lb)   SpO2 95%   BMI 24.41 kg/m      EXAM:  General Appearance: Well appearing 86 year old male, appropriate appearance for age. No acute distress   Respiratory: normal chest wall and respirations.  Normal effort.  Clear to auscultation bilaterally, no wheezing, crackles or rhonchi.  No increased work of breathing.  No cough appreciated.  Cardiac: RRR with no murmurs  Abdomen: soft, nontender, no rigidity, no rebound tenderness or guarding, normal bowel sounds present  :  No suprapubic tenderness to palpation.  Absent CVA tenderness to palpation.    Psychological: normal affect, alert, oriented, and pleasant.

## 2022-07-26 NOTE — NURSING NOTE
Chief Complaint   Patient presents with     Nausea     Patient is here for nausea that started 7-10 days ago. Patient denies diarrhea, vomiting or fevers.     Initial /64   Pulse 84   Temp 98.4  F (36.9  C) (Tympanic)   Resp 16   Ht 1.829 m (6')   Wt 81.6 kg (180 lb)   SpO2 95%   BMI 24.41 kg/m   Estimated body mass index is 24.41 kg/m  as calculated from the following:    Height as of this encounter: 1.829 m (6').    Weight as of this encounter: 81.6 kg (180 lb).  Medication Reconciliation: complete    Kelley Martinez LPN

## 2022-11-10 ENCOUNTER — TRANSFERRED RECORDS (OUTPATIENT)
Dept: HEALTH INFORMATION MANAGEMENT | Facility: OTHER | Age: 87
End: 2022-11-10

## 2023-06-12 ENCOUNTER — OFFICE VISIT (OUTPATIENT)
Dept: PEDIATRICS | Facility: OTHER | Age: 88
End: 2023-06-12
Attending: INTERNAL MEDICINE
Payer: COMMERCIAL

## 2023-06-12 VITALS
DIASTOLIC BLOOD PRESSURE: 68 MMHG | WEIGHT: 178.8 LBS | HEART RATE: 80 BPM | SYSTOLIC BLOOD PRESSURE: 110 MMHG | TEMPERATURE: 98.1 F | HEIGHT: 71 IN | OXYGEN SATURATION: 96 % | RESPIRATION RATE: 16 BRPM | BODY MASS INDEX: 25.03 KG/M2

## 2023-06-12 DIAGNOSIS — J30.2 SEASONAL ALLERGIC RHINITIS, UNSPECIFIED TRIGGER: Primary | ICD-10-CM

## 2023-06-12 PROCEDURE — 99213 OFFICE O/P EST LOW 20 MIN: CPT | Performed by: INTERNAL MEDICINE

## 2023-06-12 PROCEDURE — G0463 HOSPITAL OUTPT CLINIC VISIT: HCPCS

## 2023-06-12 ASSESSMENT — PAIN SCALES - GENERAL: PAINLEVEL: NO PAIN (0)

## 2023-06-12 NOTE — PATIENT INSTRUCTIONS
-- Try nasal saline irrigation (with distilled water)  Nasal saline spray  NeilMed sinus rinse  Neti pot   -- Start nasal steroid spray daily (eg Nasacort, Flonase)   -- When using steroid spray: tilt head forward, spray away from center septum, don't sniff deeply during inhalation.   -- Steroid spray works best when used consistently, not as needed.   -- Start daily Claritin/Allegra/Zyrtec (without -D), can help for sneezing, itchy eyes, etc.   -- Okay to use diphenhydramine (Benadryl) as needed for sneezing, nasal congestion, can cause dry mouth, urinary retention, blurry vision, constipation   -- Okay to briefly use oxymetazoline (Afrin) 2 sprays both nostrils, nightly for 3-4 days, then stop as can cause rebound congestion   -- Shower/bathe before bed to wash pollen away   -- Elevate head of bed to facilitate sinus drainage   -- Consider getting a HEPA filter   -- Use a cool mist humidifier during the dry season, clean weekly with vinegar     -- Consider ENT and/or Allergy consults if not improving

## 2023-06-12 NOTE — PROGRESS NOTES
Assessment & Plan   1. Seasonal allergic rhinitis, unspecified trigger  Think the most likely etiology is allergic rhinosinusitis however differential diagnosis also includes allergy induced asthma, COPD, gastroesophageal reflux disease, esophageal or laryngeal dysphagia, others.  Discussed options and decided on a conservative approach as outlined below.  Warning signs were discussed and prompt repeat evaluation recommended if symptoms persist or worsen.      Patient Instructions    -- Try nasal saline irrigation (with distilled water)    Nasal saline spray    NeilMed sinus rinse    Neti pot   -- Start nasal steroid spray daily (eg Nasacort, Flonase)   -- When using steroid spray: tilt head forward, spray away from center septum, don't sniff deeply during inhalation.   -- Steroid spray works best when used consistently, not as needed.   -- Start daily Claritin/Allegra/Zyrtec (without -D), can help for sneezing, itchy eyes, etc.   -- Okay to use diphenhydramine (Benadryl) as needed for sneezing, nasal congestion, can cause dry mouth, urinary retention, blurry vision, constipation   -- Okay to briefly use oxymetazoline (Afrin) 2 sprays both nostrils, nightly for 3-4 days, then stop as can cause rebound congestion   -- Shower/bathe before bed to wash pollen away   -- Elevate head of bed to facilitate sinus drainage   -- Consider getting a HEPA filter   -- Use a cool mist humidifier during the dry season, clean weekly with vinegar     -- Consider ENT and/or Allergy consults if not improving        Return if symptoms worsen or fail to improve.    Signed, Derek Guillory MD, FAAP, FACP  Internal Medicine & Pediatrics    Subjective   Anibal Tenorio is a 87 year old male who presents for evaluation of cough and congestion.  He has been sick for about 2 months.  He has a lot of postnasal drainage.  He gets a lot of phlegm in his throat.  He has had some itchy skin.  He tried some Benadryl but did not feel like it helped  "that much.  No new medications.    Objective   Vitals: /68   Pulse 80   Temp 98.1  F (36.7  C) (Tympanic)   Resp 16   Ht 1.791 m (5' 10.5\")   Wt 81.1 kg (178 lb 12.8 oz)   SpO2 96%   BMI 25.29 kg/m      HEENT: Tympanic membranes are normal.  Nasal turbinates are pale, boggy.  No cervical lymphadenopathy.  Cardiovascular: Regular, no murmur  Pulmonary: Clear  Skin: Clear    "

## 2023-06-12 NOTE — NURSING NOTE
"Chief Complaint   Patient presents with     Derm Problem     Dry itchy skin     Cough     And congestion x 2 months         Initial Temp 98.1  F (36.7  C) (Tympanic)   Resp 16   Ht 1.791 m (5' 10.5\")   Wt 81.1 kg (178 lb 12.8 oz)   BMI 25.29 kg/m   Estimated body mass index is 25.29 kg/m  as calculated from the following:    Height as of this encounter: 1.791 m (5' 10.5\").    Weight as of this encounter: 81.1 kg (178 lb 12.8 oz).         Norma J. Gosselin, LPN   "

## 2023-07-05 ENCOUNTER — APPOINTMENT (OUTPATIENT)
Dept: GENERAL RADIOLOGY | Facility: OTHER | Age: 88
End: 2023-07-05
Attending: STUDENT IN AN ORGANIZED HEALTH CARE EDUCATION/TRAINING PROGRAM
Payer: COMMERCIAL

## 2023-07-05 ENCOUNTER — HOSPITAL ENCOUNTER (EMERGENCY)
Facility: OTHER | Age: 88
Discharge: HOME | End: 2023-07-05
Attending: STUDENT IN AN ORGANIZED HEALTH CARE EDUCATION/TRAINING PROGRAM | Admitting: STUDENT IN AN ORGANIZED HEALTH CARE EDUCATION/TRAINING PROGRAM
Payer: COMMERCIAL

## 2023-07-05 VITALS
WEIGHT: 170 LBS | DIASTOLIC BLOOD PRESSURE: 81 MMHG | HEIGHT: 72 IN | OXYGEN SATURATION: 97 % | TEMPERATURE: 97.2 F | BODY MASS INDEX: 23.03 KG/M2 | HEART RATE: 59 BPM | SYSTOLIC BLOOD PRESSURE: 138 MMHG | RESPIRATION RATE: 18 BRPM

## 2023-07-05 DIAGNOSIS — R19.7 DIARRHEA, UNSPECIFIED TYPE: ICD-10-CM

## 2023-07-05 LAB
ALBUMIN SERPL BCG-MCNC: 4 G/DL (ref 3.5–5.2)
ALP SERPL-CCNC: 100 U/L (ref 40–129)
ALT SERPL W P-5'-P-CCNC: 15 U/L (ref 0–70)
ANION GAP SERPL CALCULATED.3IONS-SCNC: 10 MMOL/L (ref 7–15)
AST SERPL W P-5'-P-CCNC: 22 U/L (ref 0–45)
BASOPHILS # BLD AUTO: 0 10E3/UL (ref 0–0.2)
BASOPHILS NFR BLD AUTO: 0 %
BILIRUB SERPL-MCNC: 0.7 MG/DL
BUN SERPL-MCNC: 14.9 MG/DL (ref 8–23)
CALCIUM SERPL-MCNC: 8.8 MG/DL (ref 8.8–10.2)
CHLORIDE SERPL-SCNC: 101 MMOL/L (ref 98–107)
CREAT SERPL-MCNC: 1.09 MG/DL (ref 0.67–1.17)
CRP SERPL-MCNC: <3 MG/L
DEPRECATED HCO3 PLAS-SCNC: 27 MMOL/L (ref 22–29)
EOSINOPHIL # BLD AUTO: 0.1 10E3/UL (ref 0–0.7)
EOSINOPHIL NFR BLD AUTO: 2 %
ERYTHROCYTE [DISTWIDTH] IN BLOOD BY AUTOMATED COUNT: 14.1 % (ref 10–15)
GFR SERPL CREATININE-BSD FRML MDRD: 66 ML/MIN/1.73M2
GLUCOSE SERPL-MCNC: 99 MG/DL (ref 70–99)
HCT VFR BLD AUTO: 40.2 % (ref 40–53)
HGB BLD-MCNC: 13.8 G/DL (ref 13.3–17.7)
HOLD SPECIMEN: NORMAL
IMM GRANULOCYTES # BLD: 0 10E3/UL
IMM GRANULOCYTES NFR BLD: 0 %
LIPASE SERPL-CCNC: 15 U/L (ref 13–60)
LYMPHOCYTES # BLD AUTO: 1 10E3/UL (ref 0.8–5.3)
LYMPHOCYTES NFR BLD AUTO: 20 %
MCH RBC QN AUTO: 31.3 PG (ref 26.5–33)
MCHC RBC AUTO-ENTMCNC: 34.3 G/DL (ref 31.5–36.5)
MCV RBC AUTO: 91 FL (ref 78–100)
MONOCYTES # BLD AUTO: 0.5 10E3/UL (ref 0–1.3)
MONOCYTES NFR BLD AUTO: 11 %
NEUTROPHILS # BLD AUTO: 3.1 10E3/UL (ref 1.6–8.3)
NEUTROPHILS NFR BLD AUTO: 67 %
NRBC # BLD AUTO: 0 10E3/UL
NRBC BLD AUTO-RTO: 0 /100
PLATELET # BLD AUTO: 202 10E3/UL (ref 150–450)
POTASSIUM SERPL-SCNC: 4 MMOL/L (ref 3.4–5.3)
PROCALCITONIN SERPL IA-MCNC: 0.05 NG/ML
PROT SERPL-MCNC: 6.5 G/DL (ref 6.4–8.3)
RBC # BLD AUTO: 4.41 10E6/UL (ref 4.4–5.9)
SODIUM SERPL-SCNC: 138 MMOL/L (ref 136–145)
WBC # BLD AUTO: 4.7 10E3/UL (ref 4–11)

## 2023-07-05 PROCEDURE — 85025 COMPLETE CBC W/AUTO DIFF WBC: CPT | Performed by: STUDENT IN AN ORGANIZED HEALTH CARE EDUCATION/TRAINING PROGRAM

## 2023-07-05 PROCEDURE — 83690 ASSAY OF LIPASE: CPT | Performed by: STUDENT IN AN ORGANIZED HEALTH CARE EDUCATION/TRAINING PROGRAM

## 2023-07-05 PROCEDURE — 84145 PROCALCITONIN (PCT): CPT | Performed by: STUDENT IN AN ORGANIZED HEALTH CARE EDUCATION/TRAINING PROGRAM

## 2023-07-05 PROCEDURE — 87506 IADNA-DNA/RNA PROBE TQ 6-11: CPT | Mod: ZL,GZ

## 2023-07-05 PROCEDURE — 80053 COMPREHEN METABOLIC PANEL: CPT | Performed by: STUDENT IN AN ORGANIZED HEALTH CARE EDUCATION/TRAINING PROGRAM

## 2023-07-05 PROCEDURE — 99284 EMERGENCY DEPT VISIT MOD MDM: CPT | Performed by: STUDENT IN AN ORGANIZED HEALTH CARE EDUCATION/TRAINING PROGRAM

## 2023-07-05 PROCEDURE — 36415 COLL VENOUS BLD VENIPUNCTURE: CPT | Performed by: STUDENT IN AN ORGANIZED HEALTH CARE EDUCATION/TRAINING PROGRAM

## 2023-07-05 PROCEDURE — 87493 C DIFF AMPLIFIED PROBE: CPT | Mod: XU,GZ | Performed by: STUDENT IN AN ORGANIZED HEALTH CARE EDUCATION/TRAINING PROGRAM

## 2023-07-05 PROCEDURE — 86140 C-REACTIVE PROTEIN: CPT | Performed by: STUDENT IN AN ORGANIZED HEALTH CARE EDUCATION/TRAINING PROGRAM

## 2023-07-05 PROCEDURE — 74019 RADEX ABDOMEN 2 VIEWS: CPT

## 2023-07-05 NOTE — ED TRIAGE NOTES
Pt arrives VIA private vehicle with complaints of frequent loose stools, reporting some episodes of black stool. Onset about three weeks ago. Pt reports weight loss and decreased appetite over the last month. No dizziness or lightheadedness reported. Pt states he does not take any blood thinner.   /68   Pulse 77   Temp 97.2  F (36.2  C) (Tympanic)   Resp 18   Ht 1.829 m (6')   Wt 77.1 kg (170 lb)   SpO2 97%   BMI 23.06 kg/m          Triage Assessment     Row Name 07/05/23 4269       Triage Assessment (Adult)    Airway WDL WDL       Respiratory WDL    Respiratory WDL WDL       Skin Circulation/Temperature WDL    Skin Circulation/Temperature WDL WDL       Peripheral/Neurovascular WDL    Peripheral Neurovascular WDL WDL       Cognitive/Neuro/Behavioral WDL    Cognitive/Neuro/Behavioral WDL WDL

## 2023-07-05 NOTE — DISCHARGE INSTRUCTIONS
Follow-up with your primary care doctor the next 2 to 3 days.    Use the stool kit and return for analysis.      If you develop any abdominal pain return to the emergency department immediately.  If you develop any bloody stool return to the emergency department.    For mild to moderate pain or fever you can takeTylenol if you do not have allergies to these.    You have any worsening or concerning symptoms please call 911 or return to the emergency department immediately.

## 2023-07-05 NOTE — ED PROVIDER NOTES
ED PROVIDER NOTE  Patient Name: Anibal Tenorio  MRN: [id number]    CC:    Chief Complaint   Patient presents with     Diarrhea     8 episodes within the last day     Melena         MDM  87 year old male presenting with diarrhea.    In the ED, /68   Pulse 77   Temp 97.2  F (36.2  C) (Tympanic)   Resp 18   Ht 1.829 m (6')   Wt 77.1 kg (170 lb)   SpO2 97%   BMI 23.06 kg/m      Physical exam revealed clear auscultation bilaterally.  Benign abdominal exam.  Grossly neurologically intact.  In no acute distress, no accessory muscle use.  Overall does not look critically ill or toxic.      I have independently reviewed and interpreted the patients test results which I have ordered this visit which are the following:  Labs were remarkable for white blood cell count 4.7.  He 113.8.  Procalcitonin 0.05.  Lipase 15.  CRP less than 3.  Sodium 130.  Potassium 4.0.  Creatinine 1.09.  AST 22.  ALT 15.. Imaging included normal x-ray surprisingly shows a moderate stool burden.    Could be having diarrhea through moderate stool burden.  In addition could be infectious, patient was sent home with a stool kit told to return to the emergency room.  If he develops symptoms of constipation to consider MiraLAX and drink plenty of fluids.  Return precautions for any abdominal pain at all which would prompt abdominal imaging.  He does not have any pain today in his abdomen.  All questions answered.      Clinical impression  Diarrhea    Disposition  Home      HPI    Anibal Tenorio is a 87 year old male with PMH of impotence, diverticulosis, who presents with melena.     History of obtained by: Patient and spouse    Patient presents for approximately 1 week of nonbloody diarrhea.  Denies any suspicious foods or recent travel.  No international travel.  Denies any abdominal pain.  Does not have any lightheadedness, palpitations.  Has been able to tolerate p.o. well.  He is concerned given the amount of diarrhea he has been  having.  Denies any dysuria.  Back pain.    PMH and SH reviewed.    10 point ROS reviewed and negative except as stated in HPI.    Past medical history:  Past Medical History:   Diagnosis Date     Chronic rhinitis     No Comments Provided     Esophageal reflux     No Comments Provided     Osteoarthrosis involving lower leg     Bilateral     Vasectomy status            Social history:  Social Connections: Not on file     Social History     Socioeconomic History     Marital status:      Spouse name: None     Number of children: None     Years of education: None     Highest education level: None   Tobacco Use     Smoking status: Former     Packs/day: 0.50     Years: 20.00     Pack years: 10.00     Types: Cigarettes     Quit date: 1970     Years since quittin.5     Smokeless tobacco: Never     Tobacco comments:     Quit smoking: coffee 3/d   Vaping Use     Vaping Use: Never used   Substance and Sexual Activity     Alcohol use: Yes     Alcohol/week: 14.0 standard drinks of alcohol     Comment: Daily- evenings     Drug use: Never     Sexual activity: Not Currently   Social History Narrative    . Wife originally from [place].  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  6 children.   Quit smoking at age 35.   Wife was in a memory care unit, not able to speak,  Wife  age 76.  [place] in [place]  [facility]         I have reviewed the patients prescribed medications:  No current facility-administered medications for this encounter.    Current Outpatient Medications:      ascorbic acid (VITAMIN C) 1000 MG TABS, Take 1,000 mg by mouth daily, Disp: , Rfl:      Calcium Carb-Cholecalciferol 1000-800 MG-UNIT TABS, Take 1 tablet by mouth daily, Disp: , Rfl:      fish oil-omega-3 fatty acids 1000 MG capsule, Take 1 capsule by mouth daily, Disp: , Rfl:      Glucosamine-Chondroit-Vit C-Mn (GLUCOSAMINE 1500 COMPLEX) CAPS, Take 1 capsule by mouth 3 times daily, Disp: ,  Rfl:      L-LYSINE HCL PO, Take 1,000 mg by mouth daily, Disp: , Rfl:      propylene glycol (SYSTANE BALANCE) 0.6 % SOLN ophthalmic solution, Inject 1 drop into the eye, Disp: , Rfl:      silver sulfADIAZINE (SILVADENE) 1 % external cream, Apply topically daily, Disp: 85 g, Rfl: 1     Turmeric 400 MG CAPS, Take 1 capsule by mouth daily, Disp: , Rfl:     Allergies:  No Known Allergies      Vitals:    07/05/23 1454   BP: 104/68   Pulse: 77   Resp: 18   Temp: 97.2  F (36.2  C)   TempSrc: Tympanic   SpO2: 97%   Weight: 77.1 kg (170 lb)   Height: 1.829 m (6')       Physical Exam  Vitals: /68   Pulse 77   Temp 97.2  F (36.2  C) (Tympanic)   Resp 18   Ht 1.829 m (6')   Wt 77.1 kg (170 lb)   SpO2 97%   BMI 23.06 kg/m    Constitutional: awake, NAD  Eyes: No conjunctival injection and normal lids, PERRL, EOMI  ENT: external nose and ears atraumatic  Neck: Symmetric, trachea midline, Supple  CV: RRR, no murmurs appreciated.  Pulm: Unlabored respiratory effort, good air movement, CTAB, no w/c/r appreciated.  GI: Soft, nontender, nondistended.  No rebound or guarding  MSK: No deformities.  No cyanosis.  Neuro: AOx4, normal speech, following commands, grossly symmetric strength in all extremities.  Cranial nerves III-XII grossly intact.    Skin: warm & dry, no rashes or lesions  Psych: Appropriate mood & affect    Past medical history, past surgical history, problem list, family history, social history, medication list, and allergies were reviewed as documented in epic snapshot.  Relevant review of systems are documented within the HPI above.    Complexity of the Problems Addressed  5 (High) - 1 or more chronic illnesses with severe exacerbation, progression, or side effects of treatment or 1 acute chronic illness or injury that poses threat to live or bodily function    Complexity of Data  I have reviewed the following pertinent historical labs, diagnoses, tests, and/or imaging which contribute to complexity of this  patient's care.   4 - (Moderate) - (1 of three) I have reviewed at least 3 of the 4; external notes, review results of unique test, ordering of unique test, assessment requiring independent history. OR independent interpretation of tests done by other physician. OR discussion with specialist (including SW, PT, etc)      Complication Risk  Based on my interpretation of the current labs, historical tests and/or external tests. Patient does have a risk level as stated below of morbidity, threat to life, and bodily function, and severe exacerbation if not treated. I did consider the patients unique social determinants of care.  4 - Moderate ( Rx drug management, significant limitation of treatment options 2/2 social determinants of health, decision regarding elective major surgery without risk, decision regarding minor surgery with identified patient risk).  -Rx drug management such as giving new Rx in the ED, new Rx prescibed for home use, modification of Rx Drugs, review of home meds and considering dose adjustment but not dose adjustment made.  - major/ minor surgery discussions (regarding fracture reduction, joint relocation, chest tube, cardioversion, intubation)      ED Events, Labs and Imaging:  ED Course as of 07/05/23 1645   Wed Jul 05, 2023   1604 WBC: 4.7   1604 Hemoglobin: 13.8   1630 Xr abdomen    IMPRESSION:  Moderate volume of stool, no acute abnormality   1631 MCHC: 34.3   1632 Sodium: 138   1632 Potassium: 4.0   1632 CRP Inflammation: <3.00   1632 Lipase: 15   1640 Procalcitonin(!): 0.05       Andrew Brice MD  Emergency Medicine    Dictation Disclaimer: Some notes are completed with voice-recognition dictation software. Errors are generally corrected in real time. Please contact me via Epic staff message if you note any errors requiring clarification.     Jose Brice MD  07/05/23 1647

## 2023-07-05 NOTE — PROGRESS NOTES
Discharge instructions reviewed with pt, Stool sample kit reviewed with pt labeled and hat with spoon given

## 2023-07-06 ENCOUNTER — LAB (OUTPATIENT)
Dept: LAB | Facility: OTHER | Age: 88
End: 2023-07-06
Attending: STUDENT IN AN ORGANIZED HEALTH CARE EDUCATION/TRAINING PROGRAM
Payer: COMMERCIAL

## 2023-07-06 DIAGNOSIS — R19.5 LOOSE STOOLS: Primary | ICD-10-CM

## 2023-07-06 DIAGNOSIS — A04.9 BACTERIAL INTESTINAL INFECTION, UNSPECIFIED: ICD-10-CM

## 2023-07-06 DIAGNOSIS — R19.7 DIARRHEA, UNSPECIFIED TYPE: ICD-10-CM

## 2023-07-06 LAB — C DIFF TOX B STL QL: NEGATIVE

## 2023-07-06 NOTE — RESULT ENCOUNTER NOTE
Final C.difficile Toxin B PCR with reflex to C.difficile Antigen and Toxins A/B EIA is NEGATIVE.    No treatment or change in treatment per [facility] ED Lab Result Clostridium difficile protocol.

## 2023-07-09 ENCOUNTER — HOSPITAL ENCOUNTER (EMERGENCY)
Facility: OTHER | Age: 88
Discharge: HOME | End: 2023-07-09
Attending: EMERGENCY MEDICINE | Admitting: EMERGENCY MEDICINE
Payer: COMMERCIAL

## 2023-07-09 ENCOUNTER — APPOINTMENT (OUTPATIENT)
Dept: CT IMAGING | Facility: OTHER | Age: 88
End: 2023-07-09
Attending: EMERGENCY MEDICINE
Payer: COMMERCIAL

## 2023-07-09 VITALS
BODY MASS INDEX: 25.06 KG/M2 | HEIGHT: 72 IN | RESPIRATION RATE: 18 BRPM | SYSTOLIC BLOOD PRESSURE: 139 MMHG | HEART RATE: 75 BPM | WEIGHT: 185 LBS | OXYGEN SATURATION: 95 % | DIASTOLIC BLOOD PRESSURE: 87 MMHG | TEMPERATURE: 96.5 F

## 2023-07-09 DIAGNOSIS — K52.9 COLITIS: ICD-10-CM

## 2023-07-09 LAB
ALBUMIN SERPL BCG-MCNC: 3.8 G/DL (ref 3.5–5.2)
ALP SERPL-CCNC: 100 U/L (ref 40–129)
ALT SERPL W P-5'-P-CCNC: 16 U/L (ref 0–70)
ANION GAP SERPL CALCULATED.3IONS-SCNC: 9 MMOL/L (ref 7–15)
AST SERPL W P-5'-P-CCNC: 28 U/L (ref 0–45)
BASOPHILS # BLD AUTO: 0 10E3/UL (ref 0–0.2)
BASOPHILS NFR BLD AUTO: 1 %
BILIRUB SERPL-MCNC: 0.7 MG/DL
BUN SERPL-MCNC: 8.6 MG/DL (ref 8–23)
CALCIUM SERPL-MCNC: 8.6 MG/DL (ref 8.8–10.2)
CHLORIDE SERPL-SCNC: 100 MMOL/L (ref 98–107)
CREAT SERPL-MCNC: 0.81 MG/DL (ref 0.67–1.17)
DEPRECATED HCO3 PLAS-SCNC: 23 MMOL/L (ref 22–29)
EOSINOPHIL # BLD AUTO: 0.1 10E3/UL (ref 0–0.7)
EOSINOPHIL NFR BLD AUTO: 2 %
ERYTHROCYTE [DISTWIDTH] IN BLOOD BY AUTOMATED COUNT: 13.9 % (ref 10–15)
GFR SERPL CREATININE-BSD FRML MDRD: 85 ML/MIN/1.73M2
GLUCOSE SERPL-MCNC: 96 MG/DL (ref 70–99)
HCT VFR BLD AUTO: 41.3 % (ref 40–53)
HGB BLD-MCNC: 14.3 G/DL (ref 13.3–17.7)
HOLD SPECIMEN: NORMAL
IMM GRANULOCYTES # BLD: 0 10E3/UL
IMM GRANULOCYTES NFR BLD: 0 %
LACTATE SERPL-SCNC: 1.7 MMOL/L (ref 0.7–2)
LYMPHOCYTES # BLD AUTO: 1 10E3/UL (ref 0.8–5.3)
LYMPHOCYTES NFR BLD AUTO: 23 %
MCH RBC QN AUTO: 31.5 PG (ref 26.5–33)
MCHC RBC AUTO-ENTMCNC: 34.6 G/DL (ref 31.5–36.5)
MCV RBC AUTO: 91 FL (ref 78–100)
MONOCYTES # BLD AUTO: 0.6 10E3/UL (ref 0–1.3)
MONOCYTES NFR BLD AUTO: 13 %
NEUTROPHILS # BLD AUTO: 2.8 10E3/UL (ref 1.6–8.3)
NEUTROPHILS NFR BLD AUTO: 61 %
NRBC # BLD AUTO: 0 10E3/UL
NRBC BLD AUTO-RTO: 0 /100
PLATELET # BLD AUTO: 234 10E3/UL (ref 150–450)
POTASSIUM SERPL-SCNC: 3.8 MMOL/L (ref 3.4–5.3)
PROT SERPL-MCNC: 6.6 G/DL (ref 6.4–8.3)
RBC # BLD AUTO: 4.54 10E6/UL (ref 4.4–5.9)
SODIUM SERPL-SCNC: 132 MMOL/L (ref 136–145)
WBC # BLD AUTO: 4.5 10E3/UL (ref 4–11)

## 2023-07-09 PROCEDURE — 74177 CT ABD & PELVIS W/CONTRAST: CPT | Mod: TC

## 2023-07-09 PROCEDURE — 85025 COMPLETE CBC W/AUTO DIFF WBC: CPT | Performed by: EMERGENCY MEDICINE

## 2023-07-09 PROCEDURE — 99284 EMERGENCY DEPT VISIT MOD MDM: CPT | Performed by: EMERGENCY MEDICINE

## 2023-07-09 PROCEDURE — 99285 EMERGENCY DEPT VISIT HI MDM: CPT | Mod: 25 | Performed by: EMERGENCY MEDICINE

## 2023-07-09 PROCEDURE — 36415 COLL VENOUS BLD VENIPUNCTURE: CPT | Performed by: EMERGENCY MEDICINE

## 2023-07-09 PROCEDURE — 83605 ASSAY OF LACTIC ACID: CPT | Performed by: EMERGENCY MEDICINE

## 2023-07-09 PROCEDURE — 250N000011 HC RX IP 250 OP 636: Mod: JZ | Performed by: EMERGENCY MEDICINE

## 2023-07-09 PROCEDURE — 80053 COMPREHEN METABOLIC PANEL: CPT | Performed by: EMERGENCY MEDICINE

## 2023-07-09 RX ORDER — IOPAMIDOL 755 MG/ML
100 INJECTION, SOLUTION INTRAVASCULAR ONCE
Status: COMPLETED | OUTPATIENT
Start: 2023-07-09 | End: 2023-07-09

## 2023-07-09 RX ORDER — CIPROFLOXACIN 500 MG/1
500 TABLET, FILM COATED ORAL 2 TIMES DAILY
Qty: 20 TABLET | Refills: 0 | Status: SHIPPED | OUTPATIENT
Start: 2023-07-09 | End: 2023-12-11

## 2023-07-09 RX ORDER — METRONIDAZOLE 500 MG/1
500 TABLET ORAL 2 TIMES DAILY
Qty: 20 TABLET | Refills: 0 | Status: SHIPPED | OUTPATIENT
Start: 2023-07-09 | End: 2023-07-09

## 2023-07-09 RX ORDER — METRONIDAZOLE 500 MG/1
500 TABLET ORAL 2 TIMES DAILY
Qty: 20 TABLET | Refills: 0 | Status: SHIPPED | OUTPATIENT
Start: 2023-07-09 | End: 2023-12-11

## 2023-07-09 RX ORDER — CIPROFLOXACIN 500 MG/1
500 TABLET, FILM COATED ORAL 2 TIMES DAILY
Qty: 20 TABLET | Refills: 0 | Status: SHIPPED | OUTPATIENT
Start: 2023-07-09 | End: 2023-07-09

## 2023-07-09 RX ADMIN — IOPAMIDOL 100 ML: 755 INJECTION, SOLUTION INTRAVENOUS at 11:05

## 2023-07-09 ASSESSMENT — ENCOUNTER SYMPTOMS
ARTHRALGIAS: 0
DYSURIA: 0
VOMITING: 0
DIARRHEA: 1
CHILLS: 0
BLOOD IN STOOL: 1
AGITATION: 0
CHEST TIGHTNESS: 0
SHORTNESS OF BREATH: 0
LIGHT-HEADEDNESS: 0
FEVER: 0
ABDOMINAL PAIN: 0
NAUSEA: 0

## 2023-07-09 ASSESSMENT — ACTIVITIES OF DAILY LIVING (ADL): ADLS_ACUITY_SCORE: 35

## 2023-07-09 NOTE — DISCHARGE INSTRUCTIONS
Try the antibiotics to see if this helps with your diarrhea.  Follow-up in clinic with Dr. Guillory, however if prior to being able to see him you are feeling worse in any way, if the bleeding continues and you are feeling weak dizzy or lightheaded, or if you develop fevers or chills, return to the emergency department for reevaluation.

## 2023-07-09 NOTE — ED TRIAGE NOTES
"Pt complains of bright red blood with BM only that started last night. \"enough to cover the tissue and its not dripping\" Pt states has had loose stools \"since there was snow on the ground\" Denies pain.      Triage Assessment     Row Name 07/09/23 0936       Triage Assessment (Adult)    Airway WDL WDL       Respiratory WDL    Respiratory WDL WDL       Skin Circulation/Temperature WDL    Skin Circulation/Temperature WDL WDL       Cardiac WDL    Cardiac WDL WDL       Peripheral/Neurovascular WDL    Peripheral Neurovascular WDL WDL       Cognitive/Neuro/Behavioral WDL    Cognitive/Neuro/Behavioral WDL WDL       Opal Coma Scale    Best Eye Response 4-->(E4) spontaneous    Best Motor Response 6-->(M6) obeys commands    Best Verbal Response 5-->(V5) oriented    Opal Coma Scale Score 15              "

## 2023-07-09 NOTE — PROGRESS NOTES
IV Contrast- Discharge Instructions After Your CT Scan      The IV contrast you received today will be filtered from your bloodstream by your kidneys during the next 24 hours and pass from the body in urine.  You will not be aware of this process and your urine will not change in color.  To help this process you should drink at least 4 additional glasses of water or juice today.  This reduces stress on your kidneys.    Most contrast reactions are immediate.  Should you develop symptoms of concern after discharge, contact the department at the number below.  After hours you should contact your personal physician.  If you develop breathing distress or wheezing, call 911.      1.  Has the patient had a previous reaction to IV contrast? n    2.  Does the patient have kidney disease? n    3.  Is the patient on dialysis? n    If YES to any of these questions, exam will be reviewed with a Radiologist before administering contrast.  Pt GFR with in range for contrast.

## 2023-07-09 NOTE — ED PROVIDER NOTES
"  History     Chief Complaint   Patient presents with     Rectal Bleeding     HPI  Anibal Tenorio is a 87 year old male who is here complaining of bright red blood with his stool this morning.  He was seen here earlier in the week complaining of diarrhea that has been going on \"since there was snow on the ground\".  He thinks has been a few months that he has been having multiple diarrhea stools per day.  Normally they are a beige color.  He denies seeing any blood.  Denies black or bloody or tarry stools.  Not feeling ill otherwise.  Not much of an appetite but he is still eating and drinking.  Today when he got up this morning and went to the bathroom he wiped and there was blood covering the toilet paper.  He said he could see blood in the toilet bowl water as well.  It was bright red.    Allergies:  No Known Allergies    Problem List:    Patient Active Problem List    Diagnosis Date Noted     Seasonal allergic rhinitis, unspecified trigger 06/12/2023     Priority: Medium     Ceruminosis, bilateral 04/26/2021     Priority: Medium     Benign paroxysmal positional vertigo, unspecified laterality 04/26/2021     Priority: Medium     Hypercholesterolemia 08/27/2018     Priority: Medium     Osteoarthrosis 08/27/2018     Priority: Medium     Rotator cuff syndrome, left 08/27/2018     Priority: Medium     Status post replacement of both shoulder joints 08/27/2018     Priority: Medium     Hypertriglyceridemia 08/27/2018     Priority: Medium     Primary osteoarthritis involving multiple joints 08/27/2018     Priority: Medium     Hiatal hernia 08/14/2015     Priority: Medium     Diverticulosis of large intestine without hemorrhage 07/26/2015     Priority: Medium     Gastroesophageal reflux disease with esophagitis 07/14/2015     Priority: Medium     Cough 05/26/2015     Priority: Medium     Sinus drainage 05/26/2015     Priority: Medium     Headache 10/07/2013     Priority: Medium     Scotoma 07/25/2013     Priority: " Medium     Left knee pain 10/08/2012     Priority: Medium     Impotence of organic origin 2012     Priority: Medium     Bile acid esophageal reflux 2012     Priority: Medium        Past Medical History:    Past Medical History:   Diagnosis Date     Chronic rhinitis      Esophageal reflux      Osteoarthrosis involving lower leg      Vasectomy status        Past Surgical History:    Past Surgical History:   Procedure Laterality Date     ARTHROSCOPY SHOULDER      ,Left shoulder     ARTHROSCOPY SHOULDER      ,Right shoulder     COLONOSCOPY      circa , prior history of polyps. In [place]     COLONOSCOPY      2015,Extensive diverticulosis - maintain high fiber diet     ESOPHAGOSCOPY, GASTROSCOPY, DUODENOSCOPY (EGD), COMBINED      last EGD circa . In [place]       Family History:    Family History   Problem Relation Age of Onset     Other - See Comments Father         Psychiatric illness,Dementia     Other - See Comments Mother         Psychiatric illness,Dementia     Prostate Cancer Brother 65        Cancer-prostate,1/2 brother     Colon Cancer No family hx of         Cancer-colon       Social History:  Marital Status:   [5]  Social History     Tobacco Use     Smoking status: Former     Packs/day: 0.50     Years: 20.00     Pack years: 10.00     Types: Cigarettes     Quit date: 1970     Years since quittin.5     Smokeless tobacco: Never     Tobacco comments:     Quit smoking: coffee 3/d   Vaping Use     Vaping Use: Never used   Substance Use Topics     Alcohol use: Yes     Alcohol/week: 14.0 standard drinks of alcohol     Comment: Daily- evenings     Drug use: Never        Medications:    ciprofloxacin (CIPRO) 500 MG tablet  metroNIDAZOLE (FLAGYL) 500 MG tablet  ascorbic acid (VITAMIN C) 1000 MG TABS  Calcium Carb-Cholecalciferol 1000-800 MG-UNIT TABS  fish oil-omega-3 fatty acids 1000 MG capsule  Glucosamine-Chondroit-Vit C-Mn (GLUCOSAMINE 1500 COMPLEX) CAPS  L-LYSINE HCL PO  propylene  glycol (SYSTANE BALANCE) 0.6 % SOLN ophthalmic solution  silver sulfADIAZINE (SILVADENE) 1 % external cream  Turmeric 400 MG CAPS          Review of Systems   Constitutional: Negative for chills and fever.   HENT: Negative for congestion.    Eyes: Negative for visual disturbance.   Respiratory: Negative for chest tightness and shortness of breath.    Cardiovascular: Negative for chest pain.   Gastrointestinal: Positive for blood in stool and diarrhea. Negative for abdominal pain, nausea and vomiting.   Genitourinary: Negative for dysuria.   Musculoskeletal: Negative for arthralgias.   Skin: Negative for rash.   Neurological: Negative for light-headedness.   Psychiatric/Behavioral: Negative for agitation.       Physical Exam   BP: 96/78  Pulse: 100  Temp: (!) 96.5  F (35.8  C)  Resp: 18  Height: 182.9 cm (6')  Weight: 83.9 kg (185 lb)  SpO2: 98 %      Physical Exam  Vitals and nursing note reviewed.   Constitutional:       Appearance: Normal appearance.   HENT:      Head: Normocephalic and atraumatic.      Mouth/Throat:      Mouth: Mucous membranes are moist.   Eyes:      Conjunctiva/sclera: Conjunctivae normal.   Cardiovascular:      Rate and Rhythm: Normal rate and regular rhythm.      Heart sounds: Normal heart sounds.   Pulmonary:      Effort: Pulmonary effort is normal.      Breath sounds: Normal breath sounds.   Abdominal:      General: Abdomen is flat. Bowel sounds are normal. There is no distension.      Palpations: Abdomen is soft.      Comments: Tender left lower quadrant   Skin:     General: Skin is warm and dry.   Neurological:      Mental Status: He is alert and oriented to person, place, and time.   Psychiatric:         Behavior: Behavior normal.         ED Course              ED Course as of 07/09/23 1207   Sun Jul 09, 2023   1054 Here with over 2 months of daily diarrhea, now having bright red blood per rectum, and left lower quadrant tenderness on exam.  Concern for things like diverticulitis, we  will go ahead and obtain some labs and CT scan of abdomen and pelvis.     Procedures                  Results for orders placed or performed during the hospital encounter of 07/09/23 (from the past 24 hour(s))   [facility]fe Draw    Narrative    The following orders were created for panel order [facility]fe Draw.  Procedure                               Abnormality         Status                     ---------                               -----------         ------                     Extra Blue Top Tube[[id number]]                              Final result               Extra Red Top Tube[[id number]]                               Final result               Extra Green Top (Lithium...[[id number]]                      Final result               Extra Purple Top Tube[[id number]]                            Final result               Extra Green Top (Lithium...[[id number]]                      Final result                 Please view results for these tests on the individual orders.   Extra Blue Top Tube   Result Value Ref Range    Hold Specimen JIC    Extra Red Top Tube   Result Value Ref Range    Hold Specimen JIC    Extra Green Top (Lithium Heparin) Tube   Result Value Ref Range    Hold Specimen JIC    Extra Purple Top Tube   Result Value Ref Range    Hold Specimen JIC    Extra Green Top (Lithium Heparin) ON ICE   Result Value Ref Range    Hold Specimen JIC    CBC with platelets differential    Narrative    The following orders were created for panel order CBC with platelets differential.  Procedure                               Abnormality         Status                     ---------                               -----------         ------                     CBC with platelets and d...[[id number]]                      Final result                 Please view results for these tests on the individual orders.   Comprehensive metabolic panel   Result Value Ref Range    Sodium 132 (L) 136 - 145 mmol/L    Potassium 3.8 3.4 - 5.3 mmol/L     Chloride 100 98 - 107 mmol/L    Carbon Dioxide (CO2) 23 22 - 29 mmol/L    Anion Gap 9 7 - 15 mmol/L    Urea Nitrogen 8.6 8.0 - 23.0 mg/dL    Creatinine 0.81 0.67 - 1.17 mg/dL    Calcium 8.6 (L) 8.8 - 10.2 mg/dL    Glucose 96 70 - 99 mg/dL    Alkaline Phosphatase 100 40 - 129 U/L    AST 28 0 - 45 U/L    ALT 16 0 - 70 U/L    Protein Total 6.6 6.4 - 8.3 g/dL    Albumin 3.8 3.5 - 5.2 g/dL    Bilirubin Total 0.7 <=1.2 mg/dL    GFR Estimate 85 >60 mL/min/1.73m2   Lactic Acid STAT   Result Value Ref Range    Lactic Acid 1.7 0.7 - 2.0 mmol/L   CBC with platelets and differential   Result Value Ref Range    WBC Count 4.5 4.0 - 11.0 10e3/uL    RBC Count 4.54 4.40 - 5.90 10e6/uL    Hemoglobin 14.3 13.3 - 17.7 g/dL    Hematocrit 41.3 40.0 - 53.0 %    MCV 91 78 - 100 fL    MCH 31.5 26.5 - 33.0 pg    MCHC 34.6 31.5 - 36.5 g/dL    RDW 13.9 10.0 - 15.0 %    Platelet Count 234 150 - 450 10e3/uL    % Neutrophils 61 %    % Lymphocytes 23 %    % Monocytes 13 %    % Eosinophils 2 %    % Basophils 1 %    % Immature Granulocytes 0 %    NRBCs per 100 WBC 0 <1 /100    Absolute Neutrophils 2.8 1.6 - 8.3 10e3/uL    Absolute Lymphocytes 1.0 0.8 - 5.3 10e3/uL    Absolute Monocytes 0.6 0.0 - 1.3 10e3/uL    Absolute Eosinophils 0.1 0.0 - 0.7 10e3/uL    Absolute Basophils 0.0 0.0 - 0.2 10e3/uL    Absolute Immature Granulocytes 0.0 <=0.4 10e3/uL    Absolute NRBCs 0.0 10e3/uL   CT Abdomen Pelvis w Contrast    Narrative    PROCEDURE INFORMATION:   Exam: CT Abdomen And Pelvis With Contrast   Exam date and time: 7/9/2023 11:01 AM   Age: 87 years old   Clinical indication: Abdominal pain; Additional info: Llq pain, brbpr     TECHNIQUE:   Imaging protocol: Computed tomography of the abdomen and pelvis with contrast.   Radiation optimization: All CT scans at this facility use at least one of these   dose optimization techniques: automated exposure control; mA and/or kV   adjustment per patient size (includes targeted exams where dose is matched to    clinical indication); or iterative reconstruction.   Contrast material: ISOVUE 370; Contrast volume: 100 ml; Contrast route:   INTRAVENOUS (IV);      REPORTING DATA:   Count of CT and Cardiac NM exams in prior 12 months: This patient has received   0 known CTs and 0 known cardiac nuclear medicine studies in the 12 months prior   to the current study.     COMPARISON:   CR XR ABDOMEN 1 VIEW 7/5/2023 4:22 PM     FINDINGS:   Lungs: Bibasilar subsegmental atelectasis noted.     Liver: A focus of hypoattenuation within the left hepatic lobe adjacent to the   falciform ligament likely represents focal fatty infiltration and/or   perfusional changes.   Gallbladder and bile ducts: Cholelithiasis noted without pericholecystic   fluid/stranding.   Pancreas: No peripancreatic fluid stranding. No main pancreatic ductal   dilation.   Spleen: No splenomegaly.   Adrenal glands: The adrenal glands are normal.   Kidneys and ureters: Nephrograms are symmetric. No nephrolithiasis or   hydroureteronephrosis on either side. No solid lesions   Stomach and bowel: There is mucosal hyperenhancement and thumbprinting of the   colonic and rectal walls. Background of pancolonic diverticulosis noted   Appendix: A normal appendix is identified.     Intraperitoneal space: There is no evidence of free intraperitoneal or pelvic   fluid. There is no evidence of free intraperitoneal or pelvic fluid.   Vasculature: There is an infrarenal aortic aneurysm measuring 3.6 x 3.3 cm. No   intraluminal flap   Lymph nodes: No evidence of retroperitoneal or mesenteric lymphadenopathy.   Urinary bladder: Urinary bladder is unremarkable.   Reproductive: Unremarkable as visualized.   Bones/joints: Multilevel degenerative changes of the included spine. No acute   osseous abnormality.   Soft tissues: Unremarkable.       Impression    IMPRESSION:   1.   Infectious/inflammatory proctocolitis.   2.   Cholelithiasis     THIS DOCUMENT HAS BEEN ELECTRONICALLY SIGNED BY  MISSY MORALES MD       Medications   iopamidol (ISOVUE-370) solution 100 mL (100 mLs Intravenous $Given 7/9/23 1101)       Assessments & Plan (with Medical Decision Making)     I have reviewed the nursing notes.    I have reviewed the findings, diagnosis, plan and need for follow up with the patient.  Patient's lab work is quite reassuring with normal CBC.  No significant electrolyte abnormalities.  CT scan does show a proctocolitis which could be infectious in nature.  No signs of systemic infection with white count or elevated temperature, however given the length of his symptoms of at least a couple of months, I think it is not unreasonable to try some antibiotic.  I will give him a course of Cipro and Flagyl.  He is told that if this does not help he is probably going to need to see surgery for a colonoscopy.  We are going to try to get him scheduled to follow-up with his primary provider to see how this works.  In the meantime if he is feeling at all worse, and we discussed signs of anemia if he continues to bleed, or infection, he should return to the ER for recheck.        New Prescriptions    CIPROFLOXACIN (CIPRO) 500 MG TABLET    Take 1 tablet (500 mg) by mouth 2 times daily for 10 days    METRONIDAZOLE (FLAGYL) 500 MG TABLET    Take 1 tablet (500 mg) by mouth 2 times daily for 10 days       Final diagnoses:   Colitis       7/9/2023   [facility] AND [facility]     Anatoliy Lezama MD  07/09/23 4767

## 2023-07-10 ENCOUNTER — OFFICE VISIT (OUTPATIENT)
Dept: PEDIATRICS | Facility: OTHER | Age: 88
End: 2023-07-10
Attending: INTERNAL MEDICINE
Payer: COMMERCIAL

## 2023-07-10 VITALS
BODY MASS INDEX: 24.31 KG/M2 | SYSTOLIC BLOOD PRESSURE: 112 MMHG | TEMPERATURE: 97.7 F | RESPIRATION RATE: 16 BRPM | OXYGEN SATURATION: 97 % | WEIGHT: 179.5 LBS | HEIGHT: 72 IN | DIASTOLIC BLOOD PRESSURE: 60 MMHG | HEART RATE: 86 BPM

## 2023-07-10 DIAGNOSIS — K52.9 ACUTE COLITIS: Primary | ICD-10-CM

## 2023-07-10 DIAGNOSIS — K57.30 DIVERTICULOSIS OF LARGE INTESTINE WITHOUT HEMORRHAGE: ICD-10-CM

## 2023-07-10 PROCEDURE — G0463 HOSPITAL OUTPT CLINIC VISIT: HCPCS

## 2023-07-10 PROCEDURE — 99213 OFFICE O/P EST LOW 20 MIN: CPT | Performed by: INTERNAL MEDICINE

## 2023-07-10 ASSESSMENT — PAIN SCALES - GENERAL: PAINLEVEL: NO PAIN (0)

## 2023-07-10 NOTE — PROGRESS NOTES
Assessment & Plan   1. Acute colitis  2. Diverticulosis of large intestine without hemorrhage  It appears that he has developed acute infectious colitis of the colon which may have started with a diverticulitis flareup.  Differential diagnosis also includes Cryptosporidium, Giardia, inflammatory bowel disease, others.  Warning signs were discussed and prompt repeat evaluation recommended if symptoms persist or worsen.      Patient Instructions    -- Complete antibiotics as prescribed   -- Eat yogurt 1-2 times per day while on antibiotics (and for a few weeks after) to reduce the chances of diarrhea   -- Stay hydrated      Return if symptoms worsen or fail to improve.    Signed, Derek Guillory MD, FAAP, FACP  Internal Medicine & Pediatrics    Subjective   Anibal Tenorio is a 87 year old male who presents for follow-up.  He has been seen in the ER twice in the last week or so.  He has been having looser stools since March but they have dramatically worsened in the last week or 2.  Last night he was in the ER and prescribed antibiotics but he has not started them yet.  No fevers.  No vomiting.  No known sick contacts.  No exposure to [place].  No foreign travel.  No recent antibiotics.  No new prescription medications.  No new herbal supplements.    Objective   Vitals: /60   Pulse 86   Temp 97.7  F (36.5  C) (Tympanic)   Resp 16   Ht 1.829 m (6')   Wt 81.4 kg (179 lb 8 oz)   SpO2 97%   BMI 24.34 kg/m      Abdomen: Soft, nondistended.  No tenderness.  Bowel sounds hyperactive.    Review and Analysis of Data   I personally reviewed the following:  External notes: No  Results: Yes lab and imaging data from ER reviewed including negative stool testing and normal CRP  Use of an independent historian: No  Independent review of a test performed by another physician: No  Discussion of management with another physician: No  Moderate risk of morbidity from additional diagnostic testing and/or  treatment.

## 2023-07-10 NOTE — PATIENT INSTRUCTIONS
-- Complete antibiotics as prescribed   -- Eat yogurt 1-2 times per day while on antibiotics (and for a few weeks after) to reduce the chances of diarrhea   -- Stay hydrated

## 2023-07-10 NOTE — NURSING NOTE
Chief Complaint   Patient presents with     Follow Up     ER 7-9-23 Colitis         Initial /60   Pulse 86   Temp 97.7  F (36.5  C) (Tympanic)   Resp 16   Ht 1.829 m (6')   Wt 81.4 kg (179 lb 8 oz)   SpO2 97%   BMI 24.34 kg/m   Estimated body mass index is 24.34 kg/m  as calculated from the following:    Height as of this encounter: 1.829 m (6').    Weight as of this encounter: 81.4 kg (179 lb 8 oz).         Norma J. Gosselin, LPN

## 2023-07-24 NOTE — NURSING NOTE
"Chief Complaint   Patient presents with     Testicular Problem     right testicle enlarged     Patient presents to clinic with enlarged right testicle. He noticed it about a month or two ago, and it isn't getting any bigger, just staying the same size. He states it isn't painful, it's just there.     Initial /70 (BP Location: Right arm, Patient Position: Sitting, Cuff Size: Adult Regular)   Pulse 73   Temp 97.6  F (36.4  C) (Tympanic)   Resp 17   Ht 1.791 m (5' 10.5\")   Wt 83.9 kg (185 lb)   SpO2 96%   BMI 26.17 kg/m   Estimated body mass index is 26.17 kg/m  as calculated from the following:    Height as of this encounter: 1.791 m (5' 10.5\").    Weight as of this encounter: 83.9 kg (185 lb).         Medication Reconciliation: Complete      Nishi Bahena   " Admission

## 2023-08-11 NOTE — NURSING NOTE
"Patient presents to the clinic today for congestion and to get a spot checked on his face.    Chief Complaint   Patient presents with     Nasal Congestion     Derm Problem       Initial /70 (BP Location: Right arm, Patient Position: Sitting, Cuff Size: Adult Regular)   Pulse 70   Temp (!) 96.3  F (35.7  C) (Tympanic)   Resp 16   Wt 82.8 kg (182 lb 8 oz)   SpO2 97%   BMI 25.82 kg/m   Estimated body mass index is 25.82 kg/m  as calculated from the following:    Height as of 5/10/21: 1.791 m (5' 10.5\").    Weight as of this encounter: 82.8 kg (182 lb 8 oz).  Medication Reconciliation: complete  Bebe Zamudio LPN      FOOD SECURITY SCREENING QUESTIONS:    The next two questions are to help us understand your food security.  If you are feeling you need any assistance in this area, we have resources available to support you today.    Hunger Vital Signs:  Within the past 12 months we worried whether our food would run out before we got money to buy more. Never  Within the past 12 months the food we bought just didn't last and we didn't have money to get more. Never  Bebe Zamudio LPN,LPN on 1/11/2022 at 2:18 PM      " Pt discharged home, awake, alert, oriented x's 4,  denies any pain, no apparent distress noted. All questions and concerns addressed and answered, pt verbalizes understanding of discharge process, pt meets discharge criteria and is being discharged to car via wheelchair.

## 2023-09-13 ENCOUNTER — OFFICE VISIT (OUTPATIENT)
Dept: FAMILY MEDICINE | Facility: OTHER | Age: 88
End: 2023-09-13
Attending: PHYSICIAN ASSISTANT
Payer: COMMERCIAL

## 2023-09-13 VITALS
SYSTOLIC BLOOD PRESSURE: 114 MMHG | WEIGHT: 177 LBS | BODY MASS INDEX: 24.01 KG/M2 | HEART RATE: 84 BPM | TEMPERATURE: 97.7 F | RESPIRATION RATE: 18 BRPM | OXYGEN SATURATION: 96 % | DIASTOLIC BLOOD PRESSURE: 66 MMHG

## 2023-09-13 DIAGNOSIS — R19.7 DIARRHEA, UNSPECIFIED TYPE: Primary | ICD-10-CM

## 2023-09-13 PROCEDURE — 99213 OFFICE O/P EST LOW 20 MIN: CPT | Performed by: PHYSICIAN ASSISTANT

## 2023-09-13 PROCEDURE — G0463 HOSPITAL OUTPT CLINIC VISIT: HCPCS

## 2023-09-13 RX ORDER — ROSUVASTATIN CALCIUM 40 MG/1
40 TABLET, COATED ORAL DAILY
COMMUNITY

## 2023-09-13 RX ORDER — LOPERAMIDE HYDROCHLORIDE 2 MG/1
2 TABLET ORAL 4 TIMES DAILY PRN
Qty: 30 TABLET | Refills: 3 | Status: SHIPPED | OUTPATIENT
Start: 2023-09-13

## 2023-09-13 RX ORDER — LORATADINE 10 MG/1
10 TABLET ORAL DAILY
COMMUNITY

## 2023-09-13 ASSESSMENT — ENCOUNTER SYMPTOMS
FREQUENCY: 0
HEMATURIA: 0
CHILLS: 0
FEVER: 0
NAUSEA: 0
DIZZINESS: 0
ABDOMINAL PAIN: 0
DYSURIA: 0
LIGHT-HEADEDNESS: 0
COUGH: 0
CONSTIPATION: 0
SHORTNESS OF BREATH: 0
PSYCHIATRIC NEGATIVE: 1
PALPITATIONS: 0
DIARRHEA: 1
MYALGIAS: 0
WHEEZING: 0
VOMITING: 0

## 2023-09-13 ASSESSMENT — PAIN SCALES - GENERAL: PAINLEVEL: NO PAIN (0)

## 2023-09-13 NOTE — PROGRESS NOTES
Assessment & Plan   Problem List Items Addressed This Visit    None  Visit Diagnoses       Diarrhea, unspecified type    -  Primary    Relevant Medications    loperamide (IMODIUM A-D) 2 MG tablet    Other Relevant Orders    Adult General Surg Referral           Diarrhea: Patient was given Imodium to use as needed for symptomatic relief.  Refer to general surgery for a consult due to recurrent diarrhea for further work-up and treatment options.      Prescription drug management     See Patient Instructions    No follow-ups on file.    Apolonia Yanes PA-C  [facility]   Anibal is a 87 year old, presenting for the following health issues:  Diarrhea        9/13/2023     3:17 PM   Additional Questions   Roomed by Chantal estrada   Accompanied by malgorzata       History of Present Illness       Reason for visit:  Diarrhea  Symptom onset:  More than a month  Symptoms include:  Diarrhea and blood in stool  Symptom intensity:  Severe  Symptom progression:  Staying the same  Had these symptoms before:  No  What makes it worse:  No  What makes it better:  No    He eats 0-1 servings of fruits and vegetables daily.He consumes 0 sweetened beverage(s) daily.He exercises with enough effort to increase his heart rate 20 to 29 minutes per day.  He exercises with enough effort to increase his heart rate 3 or less days per week.   He is taking medications regularly.     Blood in the stool.  No further blood in the stool or black tarry stools.  Stools currently tan-colored.  Was given 2 medications in the emergency room for his diarrhea, ciprofloxacin and metronidazole.  He states these medications did not help alleviate his diarrhea.  He has had persistent diarrhea since then.  Typically has 4-5 diarrheal episodes per day.  No recurrent stomach pain, nausea, vomiting, fevers, chills.  Has seen gastroenterology in the past and was found to have colon polyps.  Nothing makes his diarrhea worse.  Has tried MiraLAX  and fiber which is not helping.  Pepto-Bismol upset his stomach and did not help the diarrhea.  Otherwise feeling fine.  No weight changes.    Review of Systems   Constitutional:  Negative for chills and fever.   Respiratory:  Negative for cough, shortness of breath and wheezing.    Cardiovascular:  Negative for chest pain and palpitations.   Gastrointestinal:  Positive for diarrhea. Negative for abdominal pain, constipation, nausea and vomiting.   Genitourinary:  Negative for dysuria, frequency, hematuria and urgency.   Musculoskeletal:  Negative for myalgias.   Skin:  Negative for rash.   Neurological:  Negative for dizziness and light-headedness.   Psychiatric/Behavioral: Negative.              Objective    /66   Pulse 84   Temp 97.7  F (36.5  C) (Tympanic)   Resp 18   Wt 80.3 kg (177 lb)   SpO2 96%   BMI 24.01 kg/m    Body mass index is 24.01 kg/m .  Physical Exam  Vitals and nursing note reviewed.   Constitutional:       Appearance: Normal appearance.   HENT:      Head: Normocephalic and atraumatic.   Cardiovascular:      Rate and Rhythm: Normal rate and regular rhythm.      Heart sounds: Normal heart sounds.   Pulmonary:      Effort: Pulmonary effort is normal.      Breath sounds: Normal breath sounds.   Abdominal:      General: Abdomen is flat. Bowel sounds are normal. There is no distension.      Palpations: Abdomen is soft. There is no mass.      Tenderness: There is no right CVA tenderness, left CVA tenderness, guarding or rebound.      Hernia: No hernia is present.      Comments: Minimal pain to palpation over the left lower quadrant and right lower quadrant.   Musculoskeletal:         General: Normal range of motion.   Skin:     General: Skin is warm and dry.   Neurological:      General: No focal deficit present.      Mental Status: He is alert and oriented to person, place, and time.   Psychiatric:         Mood and Affect: Mood normal.         Behavior: Behavior normal.         Thought  Content: Thought content normal.         Judgment: Judgment normal.

## 2023-09-13 NOTE — PATIENT INSTRUCTIONS
I recommend eating yogurt once daily or taking a probiotic daily.  Increase fiber intake along with fluids with electrolytes.    Use imodium as needed for diarrheal episodes.     Referred to general surgery for a consult.     Try the BRAT diet (bread, bananas, rice, applesauce, tea, toast).  This is a very bland diet which should not irritate your colon.  Hold off on spicy foods, red sauces, mexican or chinese food.    Call or return to clinic as needed if your symptoms worsen or fail to improve as anticipated.     If the pain does not begin improving, localizes to the right lower belly, there is increased fever, or other progression of symptoms, return for reassessment.    Should I see a doctor or nurse about my stomach ache? -- Most people do not need to see a doctor or nurse for a stomach ache. But you should see your doctor or nurse if:  ?You have bloody bowel movements, diarrhea, or vomiting  ?Your pain is severe and lasts more than an hour or comes and goes for more than 24 hours  ?You cannot eat or drink for hours  ?You have a fever higher than 102 F (39 C)  ?You lose a lot of weight without trying to, or lose interest in food

## 2023-09-13 NOTE — NURSING NOTE
Patient is here for on going diarrhea. States has been a couple months, was seen in ER with blood in stool.     Medication Reconciliation: complete      Chantal Medina LPN 9/13/2023 3:20 PM       Advance care directive on file? yes  Advance care directive provided to patient? na       Chantal Medina LPN

## 2023-10-02 ENCOUNTER — TELEPHONE (OUTPATIENT)
Dept: FAMILY MEDICINE | Facility: OTHER | Age: 88
End: 2023-10-02
Payer: COMMERCIAL

## 2023-10-02 NOTE — TELEPHONE ENCOUNTER
Provider is out until October 10 th. Can triage talk with patient ? Kristie Murray LPN ....................10/2/2023  11:20 AM

## 2023-10-02 NOTE — TELEPHONE ENCOUNTER
Triage RN called patient, left message with extension [phone].    FLOWER BRIONES RN on 10/2/2023 at 11:52 AM

## 2023-10-02 NOTE — TELEPHONE ENCOUNTER
Please call the patient.  He still has diarrhea very bad.          Apolonia Alonso on 10/2/2023 at 11:10 AM

## 2023-10-04 NOTE — TELEPHONE ENCOUNTER
Called and left message for patient to return call.  Called TAYLOR , Castillo, brother and left message with him to have patient return call.  Aminata Jimenez RN on 10/4/2023 at 9:55 AM

## 2023-10-05 NOTE — TELEPHONE ENCOUNTER
Attempted to contact patient again and call forward to voicemail  Will close encounter for now  Aminata Jimenez RN on 10/5/2023 at 8:33 AM

## 2023-10-11 ENCOUNTER — HOSPITAL ENCOUNTER (OUTPATIENT)
Facility: OTHER | Age: 88
End: 2023-10-11
Attending: SURGERY | Admitting: SURGERY
Payer: COMMERCIAL

## 2023-10-11 DIAGNOSIS — Z12.11 ENCOUNTER FOR SCREENING COLONOSCOPY: Primary | ICD-10-CM

## 2023-10-11 NOTE — TELEPHONE ENCOUNTER
Screening Questions for the Scheduling of Screening Colonoscopies   (If Colonoscopy is diagnostic, Provider should review the chart before scheduling.)  Are you younger than 50 or older than 80?  YES  Do you take aspirin or fish oil?  FISH OIL (if yes, tell patient to stop 1 week prior to Colonoscopy)  Do you take warfarin (Coumadin), clopidogrel (Plavix), apixaban (Eliquis), dabigatram (Pradaxa), rivaroxaban (Xarelto) or any blood thinner? NO  Do you take Ozempic? NO  Do you use oxygen at home?  NO  Do you have kidney disease? NO  Are you on dialysis? NO  Have you had a stroke or heart attack in the last year? NO  Have you had a stent in your heart or any blood vessel in the last year? NO  Have you had a transplant of any organ? NO  Have you had a colonoscopy or upper endoscopy (EGD) before? YES         When?  2020  Date of scheduled Colonoscopy. 12/28/2023  Provider CLYDE  Pharmacy [facility]

## 2023-10-12 RX ORDER — POLYETHYLENE GLYCOL 3350, SODIUM CHLORIDE, SODIUM BICARBONATE, POTASSIUM CHLORIDE 420; 11.2; 5.72; 1.48 G/4L; G/4L; G/4L; G/4L
4000 POWDER, FOR SOLUTION ORAL ONCE
Qty: 4000 ML | Refills: 0 | Status: SHIPPED | OUTPATIENT
Start: 2023-12-21 | End: 2023-12-21

## 2023-10-12 RX ORDER — BISACODYL 5 MG/1
TABLET, DELAYED RELEASE ORAL
Qty: 2 TABLET | Refills: 0 | Status: SHIPPED | OUTPATIENT
Start: 2023-12-21

## 2023-12-06 PROBLEM — R19.7 DIARRHEA: Status: ACTIVE | Noted: 2023-12-06

## 2023-12-06 RX ORDER — LIDOCAINE 40 MG/G
CREAM TOPICAL
Status: CANCELLED | OUTPATIENT
Start: 2023-12-06

## 2023-12-06 RX ORDER — ONDANSETRON 2 MG/ML
4 INJECTION INTRAMUSCULAR; INTRAVENOUS
Status: CANCELLED | OUTPATIENT
Start: 2023-12-06

## 2023-12-06 RX ORDER — SODIUM CHLORIDE, SODIUM LACTATE, POTASSIUM CHLORIDE, CALCIUM CHLORIDE 600; 310; 30; 20 MG/100ML; MG/100ML; MG/100ML; MG/100ML
INJECTION, SOLUTION INTRAVENOUS CONTINUOUS
Status: CANCELLED | OUTPATIENT
Start: 2023-12-06

## 2024-02-02 ENCOUNTER — OFFICE VISIT (OUTPATIENT)
Dept: FAMILY MEDICINE | Facility: OTHER | Age: 89
End: 2024-02-02
Attending: PHYSICIAN ASSISTANT
Payer: COMMERCIAL

## 2024-02-02 VITALS
WEIGHT: 177.4 LBS | BODY MASS INDEX: 24.03 KG/M2 | RESPIRATION RATE: 18 BRPM | OXYGEN SATURATION: 97 % | HEIGHT: 72 IN | SYSTOLIC BLOOD PRESSURE: 125 MMHG | HEART RATE: 76 BPM | DIASTOLIC BLOOD PRESSURE: 74 MMHG | TEMPERATURE: 96.9 F

## 2024-02-02 DIAGNOSIS — H35.3211 EXUDATIVE AGE-RELATED MACULAR DEGENERATION OF RIGHT EYE WITH ACTIVE CHOROIDAL NEOVASCULARIZATION (H): ICD-10-CM

## 2024-02-02 DIAGNOSIS — M54.50 ACUTE LEFT-SIDED LOW BACK PAIN WITHOUT SCIATICA: Primary | ICD-10-CM

## 2024-02-02 PROCEDURE — 99213 OFFICE O/P EST LOW 20 MIN: CPT | Performed by: PHYSICIAN ASSISTANT

## 2024-02-02 PROCEDURE — G0463 HOSPITAL OUTPT CLINIC VISIT: HCPCS

## 2024-02-02 RX ORDER — CYCLOBENZAPRINE HCL 5 MG
5 TABLET ORAL 3 TIMES DAILY PRN
Qty: 30 TABLET | Refills: 1 | Status: SHIPPED | OUTPATIENT
Start: 2024-02-02

## 2024-02-02 ASSESSMENT — PAIN SCALES - GENERAL: PAINLEVEL: NO PAIN (1)

## 2024-02-02 ASSESSMENT — ENCOUNTER SYMPTOMS: BACK PAIN: 1

## 2024-02-02 NOTE — PROGRESS NOTES
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  Up-to-date on imaging.  Continue Medrol Dosepak recently prescribed.  Patient was given cyclobenzaprine 5 mg tablets to use up to 3 times daily as needed for muscle tension and discomfort.  Refer to physical therapy for consult.  Gave stretching exercises.  Can use Tylenol as needed for symptomatic relief.  Encourage close follow-up if symptoms or not improving or worsening.    Exudative age-related macular degeneration of right eye with active choroidal neovascularization: Stable.  Continue to follow with ophthalmology.  No acute concerns at this time.    Prescription drug management     See Patient Instructions    Return if symptoms worsen or fail to improve.      Lora Barnett is a 88 year old, presenting for the following health issues:  Back Pain and Hip pain            2/2/2024     1:11 PM   Additional Questions   Roomed by Blaire     History of Present Illness       Reason for visit:  Hip and Back pain    He eats 2-3 servings of fruits and vegetables daily.He consumes 0 sweetened beverage(s) daily.He exercises with enough effort to increase his heart rate 9 or less minutes per day.  He exercises with enough effort to increase his heart rate 3 or less days per week.   He is taking medications regularly.     Patient is coming in today for left lower back pain.  Over the last 2 weeks he has been having increased left lower back pain.  He states that he was having some pain in the morning.  Ended up seeing a  chiropractor which she thought would help alleviate his mild low back pain.  Unfortunately it did not help.  Now his left lower back pain is increasingly hurting.  [facility]  Ended up completing a low back x-ray which was unremarkable.  He was given a Medrol Dosepak for symptomatic relief.  Currently on day 2.  No bowel or bladder symptoms.  Using Tylenol which helps along with a heating pad.      Review of Systems  Constitutional, HEENT, cardiovascular, pulmonary, gi and gu systems are negative, except as otherwise noted.      Objective    /74 (BP Location: Right arm, Patient Position: Sitting, Cuff Size: Adult Regular)   Pulse 76   Temp 96.9  F (36.1  C) (Tympanic)   Resp 18   Ht 1.829 m (6')   Wt 80.5 kg (177 lb 6.4 oz)   SpO2 97%   BMI 24.06 kg/m    Body mass index is 24.06 kg/m .  Physical Exam  Vitals and nursing note reviewed.   Constitutional:       Appearance: Normal appearance.   Musculoskeletal:         General: No swelling or signs of injury. Normal range of motion.      Comments: No spinal pain with palpation.  Left lumbar paraspinous muscle pain along with left low back pain with palpation.  Mild pain with palpation over the left SI joint.  Low back pain with bilateral hip flexion.  Otherwise unremarkable range of motion.   Skin:     General: Skin is warm and dry.   Neurological:      General: No focal deficit present.      Mental Status: He is alert and oriented to person, place, and time.      Gait: Gait normal.   Psychiatric:         Mood and Affect: Mood normal.         Behavior: Behavior normal.        No results found for any visits on 02/02/24.        Signed Electronically by: Apolonia Yanes PA-C

## 2024-02-02 NOTE — NURSING NOTE
Chief Complaint   Patient presents with    Back Pain    Hip pain           Initial /74 (BP Location: Right arm, Patient Position: Sitting, Cuff Size: Adult Regular)   Pulse 76   Temp 96.9  F (36.1  C) (Tympanic)   Resp 18   Ht 1.829 m (6')   Wt 80.5 kg (177 lb 6.4 oz)   SpO2 97%   BMI 24.06 kg/m   Estimated body mass index is 24.06 kg/m  as calculated from the following:    Height as of this encounter: 1.829 m (6').    Weight as of this encounter: 80.5 kg (177 lb 6.4 oz).  Medication Review: complete    The next two questions are to help us understand your food security.  If you are feeling you need any assistance in this area, we have resources available to support you today.          2/2/2024   SDOH- Food Insecurity   Within the past 12 months, did you worry that your food would run out before you got money to buy more? N   Within the past 12 months, did the food you bought just not last and you didn t have money to get more? N         Health Care Directive:  Patient has a Health Care Directive on file      Blaire Oliver